# Patient Record
Sex: FEMALE | Race: WHITE | NOT HISPANIC OR LATINO | Employment: UNEMPLOYED | URBAN - METROPOLITAN AREA
[De-identification: names, ages, dates, MRNs, and addresses within clinical notes are randomized per-mention and may not be internally consistent; named-entity substitution may affect disease eponyms.]

---

## 2017-04-12 ENCOUNTER — TELEPHONE (OUTPATIENT)
Dept: OBSTETRICS AND GYNECOLOGY | Facility: CLINIC | Age: 32
End: 2017-04-12

## 2017-04-12 NOTE — TELEPHONE ENCOUNTER
----- Message from Breonna Crews sent at 4/12/2017 12:31 PM CDT -----  Contact: Patient  X _1st Request  _  2nd Request  _  3rd Request    Who:KERRY HYLTON [[id number]]    Why:Patient called to schedule her initial ob appointment LMP 01/05/2017 Patient states she medicaid is in the process of being reactivated and I advised her that her deposit will be between 250..00    What Number to Call Back:[phone]    When to Expect a call back: (Before the end of the day)   -- if call after 3:00 call back will be tomorrow.

## 2017-05-08 ENCOUNTER — HOSPITAL ENCOUNTER (EMERGENCY)
Facility: OTHER | Age: 32
Discharge: HOME | End: 2017-05-08
Attending: EMERGENCY MEDICINE
Payer: COMMERCIAL

## 2017-05-08 VITALS
OXYGEN SATURATION: 99 % | HEART RATE: 63 BPM | SYSTOLIC BLOOD PRESSURE: 108 MMHG | RESPIRATION RATE: 14 BRPM | WEIGHT: 180 LBS | DIASTOLIC BLOOD PRESSURE: 56 MMHG | TEMPERATURE: 99 F | BODY MASS INDEX: 25.77 KG/M2 | HEIGHT: 70 IN

## 2017-05-08 DIAGNOSIS — O26.899 PREGNANCY WITH FLANK PAIN, ANTEPARTUM: Primary | ICD-10-CM

## 2017-05-08 DIAGNOSIS — O26.899 ABDOMINAL PAIN IN PREGNANCY, ANTEPARTUM: ICD-10-CM

## 2017-05-08 DIAGNOSIS — R10.9 ABDOMINAL PAIN IN PREGNANCY, ANTEPARTUM: ICD-10-CM

## 2017-05-08 DIAGNOSIS — R10.9 PREGNANCY WITH FLANK PAIN, ANTEPARTUM: Primary | ICD-10-CM

## 2017-05-08 LAB
B-HCG UR QL: POSITIVE
BILIRUB UR QL STRIP: NEGATIVE
CLARITY UR: CLEAR
COLOR UR: YELLOW
CTP QC/QA: YES
GLUCOSE UR QL STRIP: NEGATIVE
HGB UR QL STRIP: NEGATIVE
KETONES UR QL STRIP: NEGATIVE
LEUKOCYTE ESTERASE UR QL STRIP: NEGATIVE
NITRITE UR QL STRIP: NEGATIVE
PH UR STRIP: 8 [PH] (ref 5–8)
PROT UR QL STRIP: NEGATIVE
SP GR UR STRIP: 1.01 (ref 1–1.03)
URN SPEC COLLECT METH UR: NORMAL
UROBILINOGEN UR STRIP-ACNC: NEGATIVE EU/DL

## 2017-05-08 PROCEDURE — 99283 EMERGENCY DEPT VISIT LOW MDM: CPT | Mod: 25

## 2017-05-08 PROCEDURE — 81003 URINALYSIS AUTO W/O SCOPE: CPT

## 2017-05-08 PROCEDURE — 25000003 PHARM REV CODE 250: Performed by: PHYSICIAN ASSISTANT

## 2017-05-08 PROCEDURE — 87086 URINE CULTURE/COLONY COUNT: CPT

## 2017-05-08 PROCEDURE — 81025 URINE PREGNANCY TEST: CPT | Performed by: PHYSICIAN ASSISTANT

## 2017-05-08 RX ORDER — ACETAMINOPHEN 500 MG
500 TABLET ORAL EVERY 6 HOURS PRN
Qty: 20 TABLET | Refills: 0 | Status: ON HOLD | OUTPATIENT
Start: 2017-05-08 | End: 2017-10-13 | Stop reason: HOSPADM

## 2017-05-08 RX ORDER — ACETAMINOPHEN 500 MG
1000 TABLET ORAL
Status: COMPLETED | OUTPATIENT
Start: 2017-05-08 | End: 2017-05-08

## 2017-05-08 RX ADMIN — ACETAMINOPHEN 1000 MG: 500 TABLET ORAL at 02:05

## 2017-05-08 NOTE — ED TRIAGE NOTES
Patient had a positive pregnancy test around 3 weeks ago and patients last menstrual cycle was in January.  Patient has not had an OB visit yet.  Patient c/o pain to right flank and to RLQ of abdomen for several days.  Patient denies dysuria, odor or discoloration to urine.

## 2017-05-08 NOTE — ED PROVIDER NOTES
Encounter Date: 2017       History     Chief Complaint   Patient presents with    Flank Pain     LMP 1/10/17. ,  burning w/ urination x 1 week ago that has resolved. also c/o RLQ pain     Review of patient's allergies indicates:   Allergen Reactions    Ceclor [cefaclor] Rash    Sulfa (sulfonamide antibiotics) Rash     HPI Comments: Patient is a 31 y.o. Female,  (twin gestation), at approximately 16w6d ega, presenting to the emergency department with complaints of right-sided flank pain.  The patient reports her symptoms have been persistent for the past 3 days.  She states the pain is constant, and rates it at a 6/10.  She denies recent injury or trauma.  She denies numbness, tingling, weakness of her upper or lower extremities bilaterally.  She reports that approximately one week ago she felt as though she had a urinary tract infection, and home treated with cranberry juice and water.  She states she has not yet obtained prenatal care.  She admits that she had an appointment with an OB/GYN scheduled for early this morning, but states that she missed it.  She denies taking any other medication for her symptoms thus far.  She denies persistent dysuria, hematuria.  She does report some associated right lower quadrant abdominal pain that she states occurs intermittently.  She denies fever or chills. She denies vaginal discharge or vaginal bleeding.    The history is provided by the patient.     Past Medical History:   Diagnosis Date    Eczema      Past Surgical History:   Procedure Laterality Date    APPENDECTOMY      DILATION AND CURETTAGE OF UTERUS       Family History   Problem Relation Age of Onset    Family history unknown: Yes     Social History   Substance Use Topics    Smoking status: Never Smoker    Smokeless tobacco: Never Used    Alcohol use No     Review of Systems   Constitutional: Negative for activity change, appetite change, chills, fatigue and fever.   HENT: Negative for  congestion, rhinorrhea and sore throat.    Eyes: Negative for photophobia and visual disturbance.   Respiratory: Negative for cough, shortness of breath and wheezing.    Cardiovascular: Negative for chest pain.   Gastrointestinal: Positive for abdominal pain. Negative for diarrhea, nausea and vomiting.   Genitourinary: Positive for flank pain. Negative for dysuria, hematuria and urgency.   Musculoskeletal: Negative for back pain, myalgias and neck pain.   Skin: Negative for color change and wound.   Neurological: Negative for weakness and headaches.   Psychiatric/Behavioral: Negative for agitation and confusion.       Physical Exam   Initial Vitals   BP Pulse Resp Temp SpO2   05/08/17 1319 05/08/17 1319 05/08/17 1319 05/08/17 1319 05/08/17 1319   127/67 86 16 98 °F (36.7 °C) 98 %     Physical Exam    Nursing note and vitals reviewed.  Constitutional: Vital signs are normal. She appears well-developed and well-nourished. She is not diaphoretic. She is cooperative.  Non-toxic appearance. She does not have a sickly appearance. She does not appear ill. No distress.   Well appearing,  female accompanied by male in the emergency department.  She speaking clear and full sentences, moving all extremities.  She is in no acute distress.   HENT:   Head: Normocephalic and atraumatic.   Right Ear: External ear normal.   Left Ear: External ear normal.   Nose: Nose normal.   Mouth/Throat: Oropharynx is clear and moist.   Eyes: Conjunctivae and EOM are normal.   Neck: Normal range of motion. Neck supple.   Cardiovascular: Normal rate, regular rhythm and normal heart sounds.   Pulmonary/Chest: Breath sounds normal. No respiratory distress. She has no wheezes.   Abdominal: Soft. Bowel sounds are normal. She exhibits no distension. There is tenderness. There is no rebound, no guarding and no CVA tenderness.   Minimal tenderness to palpation of the right lower quadrant with no rebound, guarding, mass.  Gravid uterus with the  fundus below the umbilicus.   Musculoskeletal: Normal range of motion.   Neurological: She is alert and oriented to person, place, and time. GCS eye subscore is 4. GCS verbal subscore is 5. GCS motor subscore is 6.   Skin: Skin is warm.   Psychiatric: She has a normal mood and affect. Her behavior is normal. Judgment and thought content normal.         ED Course   Procedures  Labs Reviewed   POCT URINE PREGNANCY - Abnormal; Notable for the following:        Result Value    POC Preg Test, Ur Positive (*)     All other components within normal limits   CULTURE, URINE   CULTURE, URINE   URINALYSIS             Medical Decision Making:   History:   Old Medical Records: I decided to obtain old medical records.  Old Records Summarized: other records.       <> Summary of Records: Reviewed medical record in epic   Clinical Tests:   Lab Tests: Ordered and Reviewed  The following lab test(s) were unremarkable: UPT and Urinalysis  Other:   I have discussed this case with another health care provider.       <> Summary of the Discussion: Dr. Hsu  This note was created using Dragon Medical Dictation. There may be typographical errors secondary to dictation.     Urgent evaluation of a 31 y.o. female,  at estimated 16w6d ega, presenting to the emergency department complaining of right flank pain, and abdominal pain. Patient is afebrile, nontoxic appearing and hemodynamically stable. Physical exam reveals regular rate and rhythm, lungs are clear to auscultation bilaterally.  No CVA tenderness palpation.  Mild tenderness to palpation the right lower quadrant.  We'll plan to obtain UPT, UA, fetal heart tones and reassess.  Fetal heart tones are between 150-160 bpm. UA shows no evidence of acute urinary tract infection. Will plan to discuss the case with OBGYN for recommendations as the patient has not yet been able to establish prenatal care.   2:42 PM discussed the case with OBGYN.  Agree with treatment plan thus far.  No  further recommendations.  I did explain and stressed the patient the importance of obtaining prenatal care and close OB/GYN follow-up.  I explained that she is due for an anatomy scan.  The patient stated that she was going to walk up to the OB/GYN clinic this afternoon and try to reschedule her appointment.  Patient reports some relief her symptoms after receiving Tylenol.  Urine culture is sent.  Patient is stable for discharge home. The patient was instructed to follow up with a primary care provider in 2 days or to return to the emergency department for worsening symptoms. The treatment plan was discussed with the patient who demonstrated understanding and comfort with plan. The patient's history, physical exam, and plan of care was discussed with and agreed upon with my supervising physician.     Past Medical History:   Diagnosis Date    Eczema                      ED Course     Clinical Impression:     1. Pregnancy with flank pain, antepartum    2. Abdominal pain in pregnancy, antepartum       Disposition:   Disposition: Discharged  Condition: Stable       Vicky Frorester PA-C  05/08/17 [id number]

## 2017-05-08 NOTE — PROVIDER PROGRESS NOTES - EMERGENCY DEPT.
Encounter Date: 5/8/2017    ED Physician Progress Notes        Physician Note:   I evaluated the patient in triage and performed medical screening exam. Patient stable at this time and awaiting bed. Chief complaint and vital signs reviewed.

## 2017-05-08 NOTE — ED AVS SNAPSHOT
[facility]  [address] [address]               Rosario Barrera   2017  1:19 PM   ED    Description:  Female : 1985   Department:  [facility]           Your Care was Coordinated By:     Provider Role From To    Nikolas Hsu II, MD Attending Provider 17 1404 --    Vicky Forrester PA-C Physician Assistant 17 1319 17 1320    Vicky Forrester PA-C Physician Assistant 17 1404 --      Reason for Visit     Flank Pain           Diagnoses this Visit        Comments    Pregnancy with flank pain, antepartum    -  Primary     Abdominal pain in pregnancy, antepartum           ED Disposition     None           To Do List           Follow-up Information     Follow up with Fariba Ash MD In 2 days.    Specialty:  Obstetrics and Gynecology    Contact information:    [address] [facility] [address]  [phone]          Follow up with [facility].    Specialty:  Emergency Medicine    Why:  If symptoms worsen    Contact information:    [address] [id number] [phone]       These Medications        Disp Refills Start End    acetaminophen (TYLENOL) 500 MG tablet 20 tablet 0 2017     Take 1 tablet (500 mg total) by mouth every 6 (six) hours as needed. - Oral      [facility]     [facility] Nurse Care Line - 24/7 Assistance  Unless otherwise directed by your provider, please contact [facility], our nurse care line that is available for 24/7 assistance.     Registered nurses in the [facility] provide: appointment scheduling, clinical advisement, health education, and other advisory services.  Call: [phone] (toll free)               Medications           Message regarding Medications     Verify the changes and/or additions to your medication regime listed below are the same as discussed with your clinician today.   "If any of these changes or additions are incorrect, please notify your healthcare provider.        START taking these NEW medications        Refills    acetaminophen (TYLENOL) 500 MG tablet 0    Sig: Take 1 tablet (500 mg total) by mouth every 6 (six) hours as needed.    Class: Print    Route: Oral      These medications were administered today        Dose Freq    acetaminophen tablet 1,000 mg 1,000 mg ED 1 Time    Sig: Take 2 tablets (1,000 mg total) by mouth ED 1 Time.    Class: Normal    Route: Oral      STOP taking these medications     SPRING-C DHA 35-1-200 mg Cap Take 1 capsule by mouth once daily.    oxycodone-acetaminophen (PERCOCET) 5-325 mg per tablet Take 1 tablet by mouth every 4 (four) hours as needed.    naproxen (NAPROSYN) 500 MG tablet Take 1 tablet (500 mg total) by mouth every 8 (eight) hours as needed (cramping).    iron polysaccharides (NIFEREX) 150 mg iron Cap Take 1 capsule (150 mg total) by mouth 2 (two) times daily.    buprenorphine HCl 8 mg Subl Place 10 mg under the tongue.            Verify that the below list of medications is an accurate representation of the medications you are currently taking.  If none reported, the list may be blank. If incorrect, please contact your healthcare provider. Carry this list with you in case of emergency.           Current Medications     acetaminophen (TYLENOL) 500 MG tablet Take 1 tablet (500 mg total) by mouth every 6 (six) hours as needed.           Clinical Reference Information           Your Vitals Were     BP Pulse Temp Resp Height Weight    127/67 86 98 °F (36.7 °C) (Oral) 16 5' 10" (1.778 m) 81.6 kg (180 lb)    SpO2 BMI             98% 25.83 kg/m2         Allergies as of 5/8/2017        Reactions    Ceclor [Cefaclor] Rash    Sulfa (Sulfonamide Antibiotics) Rash      Immunizations Administered on Date of Encounter - 5/8/2017     None      ED Micro, Lab, POCT     Start Ordered       Status Ordering Provider    05/08/17 1432 05/08/17 1431  Urine " culture  Add-on      Completed     05/08/17 1321 05/08/17 1320  Urinalysis  STAT      Final result     05/08/17 1320 05/08/17 1320  POCT urine pregnancy  Once      Final result     05/08/17 1320 05/08/17 1320  Urine culture  Once      In process       ED Imaging Orders     None      Discharge References/Attachments     FLANK PAIN, UNCERTAIN CAUSE (ENGLISH)    ABDOMINAL PAIN, EARLY PREGNANCY (ENGLISH)      [facility] Sign-Up     Activating your [facility] account is as easy as 1-2-3!     1) Visit [email or web address], select Sign Up Now, enter this activation code and your date of birth, then select Next.  [email or web address]  Expires: 6/22/2017  2:55 PM      2) Create a username and password to use when you visit [facility] in the future and select a security question in case you lose your password and select Next.    3) Enter your e-mail address and click Sign Up!    Additional Information  If you have questions, please e-mail [email or web address] or call [phone] to talk to our [facility] staff. Remember, [facility] is NOT to be used for urgent needs. For medical emergencies, dial 911.          [facility] complies with applicable Federal civil rights laws and does not discriminate on the basis of race, color, national origin, age, disability, or sex.        Language Assistance Services     ATTENTION: Language assistance services are available, free of charge. Please call [phone].      ATENCIÓN: Si habla español, tiene a davis disposición servicios gratuitos de asistencia lingüística. Llame al [phone].     CHÚ Ý: N?u b?n nói Ti?ng Vi?t, có các d?ch v? h? tr? ngôn ng? mi?n phí dành cho b?n. G?i s? [phone].

## 2017-05-10 LAB
BACTERIA UR CULT: NORMAL
BACTERIA UR CULT: NORMAL

## 2017-05-16 ENCOUNTER — INITIAL PRENATAL (OUTPATIENT)
Dept: OBSTETRICS AND GYNECOLOGY | Facility: CLINIC | Age: 32
End: 2017-05-16
Payer: MEDICAID

## 2017-05-16 VITALS — BODY MASS INDEX: 27.2 KG/M2 | WEIGHT: 189.63 LBS | SYSTOLIC BLOOD PRESSURE: 110 MMHG | DIASTOLIC BLOOD PRESSURE: 80 MMHG

## 2017-05-16 DIAGNOSIS — Z87.59 HISTORY OF TWIN PREGNANCY IN PRIOR PREGNANCY: ICD-10-CM

## 2017-05-16 DIAGNOSIS — Z87.59 HISTORY OF GESTATIONAL HYPERTENSION: ICD-10-CM

## 2017-05-16 DIAGNOSIS — O09.892 SHORT INTERVAL BETWEEN PREGNANCIES AFFECTING PREGNANCY IN SECOND TRIMESTER, ANTEPARTUM: ICD-10-CM

## 2017-05-16 DIAGNOSIS — F11.10 HEROIN ABUSE: ICD-10-CM

## 2017-05-16 DIAGNOSIS — Z32.01 POSITIVE URINE PREGNANCY TEST: Primary | ICD-10-CM

## 2017-05-16 LAB
C TRACH DNA SPEC QL NAA+PROBE: NOT DETECTED
N GONORRHOEA DNA SPEC QL NAA+PROBE: NOT DETECTED

## 2017-05-16 PROCEDURE — 99213 OFFICE O/P EST LOW 20 MIN: CPT | Mod: PBBFAC | Performed by: OBSTETRICS & GYNECOLOGY

## 2017-05-16 PROCEDURE — 99214 OFFICE O/P EST MOD 30 MIN: CPT | Mod: S$PBB,TH,, | Performed by: OBSTETRICS & GYNECOLOGY

## 2017-05-16 PROCEDURE — 87086 URINE CULTURE/COLONY COUNT: CPT

## 2017-05-16 PROCEDURE — 87591 N.GONORRHOEAE DNA AMP PROB: CPT

## 2017-05-16 PROCEDURE — 99999 PR PBB SHADOW E&M-EST. PATIENT-LVL III: CPT | Mod: PBBFAC,,, | Performed by: OBSTETRICS & GYNECOLOGY

## 2017-05-16 RX ORDER — BUPRENORPHINE HYDROCHLORIDE 8 MG/1
TABLET SUBLINGUAL
Refills: 0 | COMMUNITY
Start: 2017-05-12 | End: 2017-09-26

## 2017-05-16 NOTE — PROGRESS NOTES
Good fetal movement.  No contractions, no vaginal bleeding, and no loss of fluid.    Significant history:   Recent Di-Di- twin IUP  History of CS x 1  H/o GHTN  Current Subutex for heroin addiction  Plan:   Prenatal labs and hepatitis panel; MFM ultrasound and consult ordered  Quad desired-- will wait until dates confirmed  Counseled to avoid cat litter, not garden without gloves, avoid raw meat, heat up deli meat, to eat large fish like tuna no more than once a week, and to avoid soft unpasteurized cheeses.  I recommend a PNV daily.  She should avoid ibuprofen.  Last pap 2015 in [place], normal per patient

## 2017-05-16 NOTE — MR AVS SNAPSHOT
[facility] [address]  [address]  [address]  [facility] [address]  Phone: [phone]  Fax: [phone]                  Rosario Barrera   2017 8:45 AM   Initial Prenatal    Description:  Female : 1985   Provider:  Fariba Ash MD   Department:  [facility] [address]           Reason for Visit     new pregnancy                To Do List           Goals (5 Years of Data)     None      [facility]     [facility] On [facility] Nurse Care Line -  Assistance  Unless otherwise directed by your provider, please contact [facility], our nurse care line that is available for  assistance.     Registered nurses in the [facility] Center provide: appointment scheduling, clinical advisement, health education, and other advisory services.  Call: [phone] (toll free)               Medications           Message regarding Medications     Verify the changes and/or additions to your medication regime listed below are the same as discussed with your clinician today.  If any of these changes or additions are incorrect, please notify your healthcare provider.             Verify that the below list of medications is an accurate representation of the medications you are currently taking.  If none reported, the list may be blank. If incorrect, please contact your healthcare provider. Carry this list with you in case of emergency.           Current Medications     acetaminophen (TYLENOL) 500 MG tablet Take 1 tablet (500 mg total) by mouth every 6 (six) hours as needed.    buprenorphine HCl (SUBUTEX) 8 mg Subl            Clinical Reference Information           Prenatal Vitals     Enc. Date GA Prenatal Vitals Prenatal Pulse Pain Level Urine Albumin/Glucose Edema Presentation Dilation/Effacement/Station    17  110/80 / 86 kg (189 lb 9.5 oz)   3           TW kg (0 lb)   Pregravid weight: 86 kg (189 lb 9.5 oz)       Your Vitals Were     BP Weight BMI          110/80  86 kg (189 lb 9.5 oz) 27.2 kg/m2        Allergies as of 5/16/2017     Ceclor [Cefaclor]    Sulfa (Sulfonamide Antibiotics)      Immunizations Administered on Date of Encounter - 5/16/2017     None      [facility] Sign-Up     Activating your [facility] account is as easy as 1-2-3!     1) Visit [email or web address], select Sign Up Now, enter this activation code and your date of birth, then select Next.  [email or web address]  Expires: 6/22/2017  2:55 PM      2) Create a username and password to use when you visit [facility] in the future and select a security question in case you lose your password and select Next.    3) Enter your e-mail address and click Sign Up!    Additional Information  If you have questions, please e-mail [email or web address] or call [phone] to talk to our [facility] staff. Remember, [facility] is NOT to be used for urgent needs. For medical emergencies, dial 911.         Language Assistance Services     ATTENTION: Language assistance services are available, free of charge. Please call [phone].      ATENCIÓN: Si habla español, tiene a davis disposición servicios gratuitos de asistencia lingüística. Llame al [phone].     CHÚ Ý: N?u b?n nói Ti?ng Vi?t, có các d?ch v? h? tr? ngôn ng? mi?n phí dành cho b?n. G?i s? [phone].         [facility] [address] complies with applicable Federal civil rights laws and does not discriminate on the basis of race, color, national origin, age, disability, or sex.

## 2017-05-17 LAB
BACTERIA UR CULT: NORMAL
BACTERIA UR CULT: NORMAL

## 2017-06-09 ENCOUNTER — OFFICE VISIT (OUTPATIENT)
Dept: MATERNAL FETAL MEDICINE | Facility: CLINIC | Age: 32
End: 2017-06-09
Attending: OBSTETRICS & GYNECOLOGY
Payer: MEDICAID

## 2017-06-09 VITALS
DIASTOLIC BLOOD PRESSURE: 88 MMHG | BODY MASS INDEX: 27.43 KG/M2 | WEIGHT: 191.13 LBS | SYSTOLIC BLOOD PRESSURE: 140 MMHG

## 2017-06-09 DIAGNOSIS — Z32.01 POSITIVE URINE PREGNANCY TEST: ICD-10-CM

## 2017-06-09 DIAGNOSIS — Z36.89 ENCOUNTER FOR ULTRASOUND TO CHECK FETAL GROWTH: Primary | ICD-10-CM

## 2017-06-09 PROCEDURE — 99212 OFFICE O/P EST SF 10 MIN: CPT | Mod: PBBFAC,25 | Performed by: OBSTETRICS & GYNECOLOGY

## 2017-06-09 PROCEDURE — 99214 OFFICE O/P EST MOD 30 MIN: CPT | Mod: 25,TH,S$PBB, | Performed by: OBSTETRICS & GYNECOLOGY

## 2017-06-09 PROCEDURE — 99999 PR PBB SHADOW E&M-EST. PATIENT-LVL II: CPT | Mod: PBBFAC,,, | Performed by: OBSTETRICS & GYNECOLOGY

## 2017-06-09 PROCEDURE — 76811 OB US DETAILED SNGL FETUS: CPT | Mod: 26,S$PBB,, | Performed by: OBSTETRICS & GYNECOLOGY

## 2017-06-09 PROCEDURE — 76811 OB US DETAILED SNGL FETUS: CPT | Mod: PBBFAC | Performed by: OBSTETRICS & GYNECOLOGY

## 2017-06-09 NOTE — PROGRESS NOTES
Chief complaint: Opioid use in pregnancy    Physician requesting consultation: Fariba Ash, *    31 y.o.  at 22w1d    Please review today's ultrasound report for specific details.    Today I discussed with the patient the association of maternal opioid use and  outcomes.  In particular I discussed the risk of acute maternal withdrawal and the increased risk of spontaneous miscarriage.  I also discussed with the patient the association of increased rates of fetal growth restriction and opioid use.  I further reviewed the risk of  addiction and the need for  hospitalization for the management of  abstinence syndrome.  I explained to the patient that is not uncommon for some neonates to stay in the hospital for days to weeks for treatment and that treatment regimens include medications such as methadone, phenobarbital and morphine.    For the remainder of the pregnancy I would recommend the patient continue with opioid replacement at a level sufficient to prevent acute maternal withdrawal.  This can be accomplished by the administration methadone or suboxone by a specialist qualified in the management of opioid addiction were by her primary obstetrician if desired.  I recommend repeat fetal growth assessments every 4-6 weeks starting in the third trimester to assure no evidence of fetal growth restriction.  I would consider induction of labor at 39 weeks if cervix is favorable.  I would be cognizant that during labor fetal nonstress testing may be nonreactive secondary to opioid effects on fetal heart rate variability.  Because of the increased risk of false positive fetal non-stress tests in women who take daily opiods, I would not recommend routine third trimester fetal non-stress tests unless evidence of fetal growth restriction is found.     Results of today's ultrasound discussed with patient.  I spent 30 minutes with patient today over half of which was in  consultation separate of her ultrasound examination.     Referring physician to receive copy of today's consultation via electronic medical record.

## 2017-06-09 NOTE — LETTER
June 9, 2017      Fariba Ash MD  [address] [facility] [address]           [facility]  [address]  [facility] [address]  Phone: [phone]          Patient: Rosario Barrera   MR Number: [id number]   YOB: 1985   Date of Visit: 6/9/2017       Dear Dr. Fariba Ash:    Thank you for referring Rosario Barrera to me for evaluation. Attached you will find relevant portions of my assessment and plan of care.    If you have questions, please do not hesitate to call me. I look forward to following Rosario Barrera along with you.    Sincerely,    Paulino Cohen MD    Enclosure  CC:  No Recipients    If you would like to receive this communication electronically, please contact [email or web address] or [phone] to request more information on [address] Link access.    For providers and/or their staff who would like to refer a patient to [facility], please contact us through our one-stop-shop provider referral line, [facility], at [phone].    If you feel you have received this communication in error or would no longer like to receive these types of communications, please e-mail [email or web address]

## 2017-07-11 ENCOUNTER — OFFICE VISIT (OUTPATIENT)
Dept: MATERNAL FETAL MEDICINE | Facility: CLINIC | Age: 32
End: 2017-07-11
Payer: MEDICAID

## 2017-07-11 DIAGNOSIS — Z36.89 ENCOUNTER FOR ULTRASOUND TO CHECK FETAL GROWTH: ICD-10-CM

## 2017-07-11 PROCEDURE — 76816 OB US FOLLOW-UP PER FETUS: CPT | Mod: PBBFAC | Performed by: OBSTETRICS & GYNECOLOGY

## 2017-07-11 PROCEDURE — 76816 OB US FOLLOW-UP PER FETUS: CPT | Mod: 26,S$PBB,, | Performed by: OBSTETRICS & GYNECOLOGY

## 2017-07-11 PROCEDURE — 99499 UNLISTED E&M SERVICE: CPT | Mod: S$PBB,,, | Performed by: OBSTETRICS & GYNECOLOGY

## 2017-07-11 NOTE — PROGRESS NOTES
Indication  ========    f/u growth/ hx heroine abuse.    History  ======    General History  Other: Heroin abuse  no screenings  Previous Outcomes   3  Para 1  Vinson children born living (T) 2  Vinson children born (T) 2  Abortions (A) 1  Vinson living children (L) 2  Miscarriages 1    Method  ======    Transabdominal ultrasound examination. View: Sufficient.    Pregnancy  =========    Vinson pregnancy. Number of fetuses: 1.    Dating  ======    LMP on: 2017  GA by LMP 26 w + 5 d  GILSON by LMP: 10/12/2017  Ultrasound examination on: 2017  GA by U/S based upon: AC, BPD, Femur, HC  GA by U/S 28 w + 5 d  GILSON by U/S: 2017  Assigned: Dating performed on 2017, based on the LMP  Assigned GA 26 w + 5 d  Assigned GILSON: 10/12/2017    General Evaluation  ==============    Cardiac activity: present.  bpm.  Fetal movements: visualized.  Presentation: breech.  Placenta: anterior.  Umbilical cord: 3 vessel cord.  Amniotic fluid: MVP 6.1 cm.    Fetal Biometry  ============    Fetal Biometry  BPD 71.8 mm 28w 6d Hadlock  OFD 93.1 mm 30w 0d Kal  .3 mm 28w 5d Hadlock  .0 mm 29w 5d Hadlock  Femur 51.4 mm 27w 3d Hadlock  CM 5.5 mm  EFW 1,296 g 75% Jorge Alberto  Calculated by: Hadlock (BPD-HC-AC-FL)  EFW (lb) 2 lb  EFW (oz) 14 oz  Cephalic index 0.77  HC / AC 1.03  FL / BPD 0.72  FL / AC 0.20  MVP 6.1 cm   bpm  Head / Face / Neck   7.6 mm    Fetal Anatomy  ============    Cranium: normal  Lateral ventricles: normal  Cavum septi pellucidi: normal  Vermis: normal  Neck: normal  Lips: normal  Profile: normal  Nose: normal  4-chamber view: normal  Stomach: normal  Kidneys: normal  Bladder: normal  Sacral spine: normal  Rt hand: open  Lt hand: open  Wants to know gender: no  Other: A full anatomic survey has been previously performed.    Impression  =========    Fetal size is AGA with the EFW at the 75th percentile.  Normal repeat limited fetal anatomic survey. AFV is  normal.    Recommendation  ==============    We recommend repeat evaluation for fetal growth assessment in 6 weeks.

## 2017-07-13 ENCOUNTER — TELEPHONE (OUTPATIENT)
Dept: OBSTETRICS AND GYNECOLOGY | Facility: CLINIC | Age: 32
End: 2017-07-13

## 2017-07-13 NOTE — TELEPHONE ENCOUNTER
----- Message from Fariba Ash MD sent at 7/12/2017  9:08 AM CDT -----  Patient has no-showed two appointments.  Please call and see if we can get her scheduled.      Thanks!

## 2017-08-16 ENCOUNTER — TELEPHONE (OUTPATIENT)
Dept: OBSTETRICS AND GYNECOLOGY | Facility: CLINIC | Age: 32
End: 2017-08-16

## 2017-08-16 NOTE — TELEPHONE ENCOUNTER
----- Message from Nena Lewis sent at 8/16/2017 12:37 PM CDT -----  Contact: pt  X  1st Request  _  2nd Request  _  3rd Request    Who:KERRY HYLTON [[id number]]    Why: Patient states she is returning a call     What Number to Call Back: [phone]    When to Expect a call back: (Before the end of the day)   -- if call after 3:00 call back will be tomorrow.

## 2017-08-16 NOTE — TELEPHONE ENCOUNTER
----- Message from Gary Rodríguez sent at 8/16/2017 10:47 AM CDT -----  Please call ob pt no details [phone]

## 2017-08-16 NOTE — TELEPHONE ENCOUNTER
Called pt and she said that she needs a letter from us stating that we know that she takes subutex. I informed pt that she needs to come into our office first. We have not seen pt since her first ob appt. Pt canceled and no showed all other appts. Pt verbalized understanding. Made appt with Nilsa tomorrow.

## 2017-08-18 ENCOUNTER — TELEPHONE (OUTPATIENT)
Dept: OBSTETRICS AND GYNECOLOGY | Facility: CLINIC | Age: 32
End: 2017-08-18

## 2017-08-24 ENCOUNTER — TELEPHONE (OUTPATIENT)
Dept: OBSTETRICS AND GYNECOLOGY | Facility: CLINIC | Age: 32
End: 2017-08-24

## 2017-08-24 NOTE — TELEPHONE ENCOUNTER
----- Message from Guanako Nance RN sent at 8/21/2017  3:16 PM CDT -----  Thank you!  ----- Message -----  From: Bonnie Naik LPN  Sent: 8/21/2017   2:27 PM  To: Guanako Nance RN    Ok. I'm leaving in 5 minutes but I'll call her tomorrow.    ----- Message -----  From: Guanako Nance RN  Sent: 8/21/2017   1:24 PM  To: John RAIN III Staff, Saqib CARMICHAEL Staff     Hi ladies,    Can one of you help to get this patient scheduled at [place], please see Dr. Ash's message.  ----- Message -----  From: Fariba Ash MD  Sent: 8/21/2017  12:20 PM  To: Guanako Nance RN    Please get her scheduled with the [facility] as I have seen her one time and she has no showed >3 visits.    Thanks!    ----- Message -----  From: Guanako Nance RN  Sent: 8/21/2017  11:48 AM  To: Fariba Ash MD    Hi Dr. Ash    Pt is scheduled for f/u MFM scan tomorrow at 120p and she has not seen you since May for prenatal visit. If she shows up, do you want to see her? She will be 32w5d tomorrow    guanako

## 2017-08-31 ENCOUNTER — TELEPHONE (OUTPATIENT)
Dept: OBSTETRICS AND GYNECOLOGY | Facility: CLINIC | Age: 32
End: 2017-08-31

## 2017-08-31 NOTE — TELEPHONE ENCOUNTER
----- Message from Gary Rodríguez sent at 8/31/2017  2:37 PM CDT -----  PLEASE CALL PT NO DETAILS [phone]

## 2017-09-12 DIAGNOSIS — O99.320 PREGNANCY COMPLICATED BY SUBUTEX MAINTENANCE, ANTEPARTUM: Primary | ICD-10-CM

## 2017-09-12 DIAGNOSIS — F11.20 PREGNANCY COMPLICATED BY SUBUTEX MAINTENANCE, ANTEPARTUM: Primary | ICD-10-CM

## 2017-09-14 ENCOUNTER — ROUTINE PRENATAL (OUTPATIENT)
Dept: OBSTETRICS AND GYNECOLOGY | Facility: CLINIC | Age: 32
End: 2017-09-14
Payer: MEDICAID

## 2017-09-14 ENCOUNTER — LAB VISIT (OUTPATIENT)
Dept: LAB | Facility: OTHER | Age: 32
End: 2017-09-14
Attending: OBSTETRICS & GYNECOLOGY
Payer: MEDICAID

## 2017-09-14 ENCOUNTER — TELEPHONE (OUTPATIENT)
Dept: OBSTETRICS AND GYNECOLOGY | Facility: CLINIC | Age: 32
End: 2017-09-14

## 2017-09-14 ENCOUNTER — PATIENT MESSAGE (OUTPATIENT)
Dept: OBSTETRICS AND GYNECOLOGY | Facility: CLINIC | Age: 32
End: 2017-09-14

## 2017-09-14 VITALS
DIASTOLIC BLOOD PRESSURE: 80 MMHG | SYSTOLIC BLOOD PRESSURE: 130 MMHG | BODY MASS INDEX: 30.97 KG/M2 | WEIGHT: 215.81 LBS

## 2017-09-14 DIAGNOSIS — O09.33 LIMITED PRENATAL CARE IN THIRD TRIMESTER: ICD-10-CM

## 2017-09-14 DIAGNOSIS — D50.8 ANEMIA, DUE TO INADEQUATE IRON INTAKE: Primary | ICD-10-CM

## 2017-09-14 DIAGNOSIS — F11.10 HEROIN ABUSE: ICD-10-CM

## 2017-09-14 DIAGNOSIS — O09.892 SHORT INTERVAL BETWEEN PREGNANCIES AFFECTING PREGNANCY IN SECOND TRIMESTER, ANTEPARTUM: ICD-10-CM

## 2017-09-14 DIAGNOSIS — Z32.01 POSITIVE URINE PREGNANCY TEST: ICD-10-CM

## 2017-09-14 DIAGNOSIS — F19.10 DRUG ABUSE: Primary | ICD-10-CM

## 2017-09-14 LAB
ABO + RH BLD: NORMAL
BASOPHILS # BLD AUTO: 0.02 K/UL
BASOPHILS NFR BLD: 0.3 %
BLD GP AB SCN CELLS X3 SERPL QL: NORMAL
DIFFERENTIAL METHOD: ABNORMAL
EOSINOPHIL # BLD AUTO: 0.2 K/UL
EOSINOPHIL NFR BLD: 3.4 %
ERYTHROCYTE [DISTWIDTH] IN BLOOD BY AUTOMATED COUNT: 15 %
ESTIMATED AVG GLUCOSE: 103 MG/DL
HBA1C MFR BLD HPLC: 5.2 %
HCT VFR BLD AUTO: 28.7 %
HGB BLD-MCNC: 8.9 G/DL
LYMPHOCYTES # BLD AUTO: 1.8 K/UL
LYMPHOCYTES NFR BLD: 28 %
MCH RBC QN AUTO: 23.4 PG
MCHC RBC AUTO-ENTMCNC: 31 G/DL
MCV RBC AUTO: 76 FL
MONOCYTES # BLD AUTO: 0.5 K/UL
MONOCYTES NFR BLD: 8.1 %
NEUTROPHILS # BLD AUTO: 3.9 K/UL
NEUTROPHILS NFR BLD: 59.9 %
PLATELET # BLD AUTO: 279 K/UL
PMV BLD AUTO: 8.8 FL
RBC # BLD AUTO: 3.8 M/UL
WBC # BLD AUTO: 6.44 K/UL

## 2017-09-14 PROCEDURE — 86850 RBC ANTIBODY SCREEN: CPT

## 2017-09-14 PROCEDURE — 80074 ACUTE HEPATITIS PANEL: CPT

## 2017-09-14 PROCEDURE — 83036 HEMOGLOBIN GLYCOSYLATED A1C: CPT

## 2017-09-14 PROCEDURE — 99212 OFFICE O/P EST SF 10 MIN: CPT | Mod: PBBFAC,25 | Performed by: OBSTETRICS & GYNECOLOGY

## 2017-09-14 PROCEDURE — 86592 SYPHILIS TEST NON-TREP QUAL: CPT

## 2017-09-14 PROCEDURE — 86703 HIV-1/HIV-2 1 RESULT ANTBDY: CPT

## 2017-09-14 PROCEDURE — 3008F BODY MASS INDEX DOCD: CPT | Mod: ,,, | Performed by: OBSTETRICS & GYNECOLOGY

## 2017-09-14 PROCEDURE — 99999 PR PBB SHADOW E&M-EST. PATIENT-LVL II: CPT | Mod: PBBFAC,,, | Performed by: OBSTETRICS & GYNECOLOGY

## 2017-09-14 PROCEDURE — 99214 OFFICE O/P EST MOD 30 MIN: CPT | Mod: TH,S$PBB,, | Performed by: OBSTETRICS & GYNECOLOGY

## 2017-09-14 PROCEDURE — 80307 DRUG TEST PRSMV CHEM ANLYZR: CPT

## 2017-09-14 PROCEDURE — 36415 COLL VENOUS BLD VENIPUNCTURE: CPT

## 2017-09-14 PROCEDURE — 85025 COMPLETE CBC W/AUTO DIFF WBC: CPT

## 2017-09-14 PROCEDURE — 86900 BLOOD TYPING SEROLOGIC ABO: CPT

## 2017-09-14 PROCEDURE — 86762 RUBELLA ANTIBODY: CPT

## 2017-09-14 RX ORDER — FERROUS SULFATE 325(65) MG
325 TABLET ORAL DAILY
Qty: 30 TABLET | Refills: 3 | Status: SHIPPED | OUTPATIENT
Start: 2017-09-14 | End: 2018-09-14

## 2017-09-14 NOTE — PROGRESS NOTES
Patient presents today for second prenatal visit.  Good fetal movement.  No contractions, no vaginal bleeding, and no loss of fluid.    Limited prenatal care-  - Patient to go to lab today for all prenatal labs and A1c, hepatitis panel, drug screen  - A1c rather than glucose test- patient cannot stay for an hour today.    - will schedule CS for 40 weeks given that first ultrasound was at 23w5d  History of CS-   - op note reviewed and safe for ; discussed risks of  vs CS.  Patient desires repeat CS.    - Concerns for pain relief- will refer to anesthesia.     Heroin abuse-   - patient is still taking suboxone.  Patient is working with drugs of abuse doctor and should be getting subutex; next appointment with them in 1.5 weeks.  Will have anesthesia referral to discuss pain control.  High risk pregnancy:   - follow up with MFM scheduled for next week   - after this, will schedule twice weekly prenatal testing until delivery

## 2017-09-15 LAB
AMPHET+METHAMPHET UR QL: NEGATIVE
BARBITURATES UR QL SCN>200 NG/ML: NEGATIVE
BENZODIAZ UR QL SCN>200 NG/ML: NEGATIVE
BZE UR QL SCN: NEGATIVE
CANNABINOIDS UR QL SCN: NEGATIVE
CREAT UR-MCNC: 53 MG/DL
ETHANOL UR-MCNC: <10 MG/DL
HAV IGM SERPL QL IA: NEGATIVE
HBV CORE IGM SERPL QL IA: NEGATIVE
HBV SURFACE AG SERPL QL IA: NEGATIVE
HBV SURFACE AG SERPL QL IA: NEGATIVE
HCV AB SERPL QL IA: POSITIVE
HIV 1+2 AB+HIV1 P24 AG SERPL QL IA: NEGATIVE
METHADONE UR QL SCN>300 NG/ML: NEGATIVE
OPIATES UR QL SCN: NORMAL
PCP UR QL SCN>25 NG/ML: NEGATIVE
RPR SER QL: NORMAL
RUBV IGG SER-ACNC: 21.5 IU/ML
RUBV IGG SER-IMP: REACTIVE
TOXICOLOGY INFORMATION: NORMAL

## 2017-09-18 PROBLEM — B19.20 HEPATITIS C: Status: ACTIVE | Noted: 2017-09-18

## 2017-09-21 ENCOUNTER — LAB VISIT (OUTPATIENT)
Dept: LAB | Facility: OTHER | Age: 32
End: 2017-09-21
Attending: OBSTETRICS & GYNECOLOGY
Payer: MEDICAID

## 2017-09-21 ENCOUNTER — ROUTINE PRENATAL (OUTPATIENT)
Dept: OBSTETRICS AND GYNECOLOGY | Facility: CLINIC | Age: 32
End: 2017-09-21
Payer: MEDICAID

## 2017-09-21 VITALS
SYSTOLIC BLOOD PRESSURE: 110 MMHG | BODY MASS INDEX: 29.45 KG/M2 | DIASTOLIC BLOOD PRESSURE: 70 MMHG | WEIGHT: 205.25 LBS

## 2017-09-21 DIAGNOSIS — B19.20 HEPATITIS C VIRUS INFECTION WITHOUT HEPATIC COMA, UNSPECIFIED CHRONICITY: ICD-10-CM

## 2017-09-21 DIAGNOSIS — O09.33 LIMITED PRENATAL CARE IN THIRD TRIMESTER: ICD-10-CM

## 2017-09-21 DIAGNOSIS — F19.10 DRUG ABUSE: ICD-10-CM

## 2017-09-21 DIAGNOSIS — B19.20 HEPATITIS C VIRUS INFECTION WITHOUT HEPATIC COMA, UNSPECIFIED CHRONICITY: Primary | ICD-10-CM

## 2017-09-21 LAB
ALBUMIN SERPL BCP-MCNC: 2.8 G/DL
ALP SERPL-CCNC: 144 U/L
ALT SERPL W/O P-5'-P-CCNC: 29 U/L
ANION GAP SERPL CALC-SCNC: 10 MMOL/L
AST SERPL-CCNC: 27 U/L
BILIRUB SERPL-MCNC: 0.5 MG/DL
BUN SERPL-MCNC: 7 MG/DL
CALCIUM SERPL-MCNC: 9.3 MG/DL
CHLORIDE SERPL-SCNC: 105 MMOL/L
CO2 SERPL-SCNC: 21 MMOL/L
CREAT SERPL-MCNC: 0.7 MG/DL
EST. GFR  (AFRICAN AMERICAN): >60 ML/MIN/1.73 M^2
EST. GFR  (NON AFRICAN AMERICAN): >60 ML/MIN/1.73 M^2
GLUCOSE SERPL-MCNC: 93 MG/DL
POTASSIUM SERPL-SCNC: 3.9 MMOL/L
PROT SERPL-MCNC: 7.4 G/DL
SODIUM SERPL-SCNC: 136 MMOL/L

## 2017-09-21 PROCEDURE — 99999 PR PBB SHADOW E&M-EST. PATIENT-LVL II: CPT | Mod: PBBFAC,,, | Performed by: OBSTETRICS & GYNECOLOGY

## 2017-09-21 PROCEDURE — 99213 OFFICE O/P EST LOW 20 MIN: CPT | Mod: TH,S$PBB,, | Performed by: OBSTETRICS & GYNECOLOGY

## 2017-09-21 PROCEDURE — 87522 HEPATITIS C REVRS TRNSCRPJ: CPT

## 2017-09-21 PROCEDURE — 99212 OFFICE O/P EST SF 10 MIN: CPT | Mod: PBBFAC | Performed by: OBSTETRICS & GYNECOLOGY

## 2017-09-21 PROCEDURE — 3008F BODY MASS INDEX DOCD: CPT | Mod: ,,, | Performed by: OBSTETRICS & GYNECOLOGY

## 2017-09-21 PROCEDURE — 80053 COMPREHEN METABOLIC PANEL: CPT

## 2017-09-21 PROCEDURE — 87081 CULTURE SCREEN ONLY: CPT

## 2017-09-21 PROCEDURE — 36415 COLL VENOUS BLD VENIPUNCTURE: CPT

## 2017-09-21 NOTE — PROGRESS NOTES
Good fetal movement.  No contractions, no vaginal bleeding, and no loss of fluid.    Hepatitis C- I counseled the patient that the presence ofHCV antibody alone does not necessarily result in the vertical transmission of HCV. Only those who have evidence ofHCV viral RNA are at risk for vertical transmission. The rate of transmission is 5% when the patient is HIV negative and 23 % when the patient is HIV positive. The patient understands that there are no means to prevent the vertical transmission of HCV. It is thought that the risk of transmission is related to the level of viremia at birth. Although not positively proven it is thought that prolonged rupture of the membranes may increase the risk of vertical transmission. For this reason it is recommended that the second stage of labor be short. It does not appear that  delivery reduces the risk of vertical transmission in these patients. However, if the patient has HCV RNA the pediatrician should be notified at delivery so that the infant can be tested later. Generally testing occurs at 6- 12 months of age.   There is no safe antiviral treatment for the pregnant woman with hepatitis C. Both ribavirin in interferon have been associated with teratogenesis in animals and are contraindicated in pregnancy.  - HCV RNA PCR test ordered today  - LFTs today  - ultrasound and consultation with JASWANT this Tuesday

## 2017-09-22 ENCOUNTER — HOSPITAL ENCOUNTER (OUTPATIENT)
Dept: PERINATAL CARE | Facility: OTHER | Age: 32
Discharge: HOME | End: 2017-09-22
Attending: OBSTETRICS & GYNECOLOGY
Payer: MEDICAID

## 2017-09-22 DIAGNOSIS — B19.20 HEPATITIS C VIRUS INFECTION WITHOUT HEPATIC COMA, UNSPECIFIED CHRONICITY: ICD-10-CM

## 2017-09-22 DIAGNOSIS — F19.10 DRUG ABUSE: ICD-10-CM

## 2017-09-22 DIAGNOSIS — O09.33 LIMITED PRENATAL CARE IN THIRD TRIMESTER: ICD-10-CM

## 2017-09-22 PROCEDURE — 76815 OB US LIMITED FETUS(S): CPT | Mod: 26,,, | Performed by: OBSTETRICS & GYNECOLOGY

## 2017-09-22 PROCEDURE — 59025 FETAL NON-STRESS TEST: CPT | Mod: 26,,, | Performed by: OBSTETRICS & GYNECOLOGY

## 2017-09-22 PROCEDURE — 76815 OB US LIMITED FETUS(S): CPT

## 2017-09-22 PROCEDURE — 59025 FETAL NON-STRESS TEST: CPT

## 2017-09-25 DIAGNOSIS — B17.10 ACUTE HEPATITIS C VIRUS INFECTION WITHOUT HEPATIC COMA: Primary | ICD-10-CM

## 2017-09-25 LAB
BACTERIA SPEC AEROBE CULT: NORMAL
HCV LOG: 5.11 LOG (10) IU/ML
HCV RNA QUANT PCR: ABNORMAL IU/ML
HCV, QUALITATIVE: DETECTED IU/ML

## 2017-09-26 ENCOUNTER — ROUTINE PRENATAL (OUTPATIENT)
Dept: OBSTETRICS AND GYNECOLOGY | Facility: CLINIC | Age: 32
End: 2017-09-26
Payer: MEDICAID

## 2017-09-26 ENCOUNTER — OFFICE VISIT (OUTPATIENT)
Dept: MATERNAL FETAL MEDICINE | Facility: CLINIC | Age: 32
End: 2017-09-26
Payer: MEDICAID

## 2017-09-26 ENCOUNTER — APPOINTMENT (OUTPATIENT)
Dept: ANESTHESIOLOGY | Facility: OTHER | Age: 32
End: 2017-09-26
Payer: MEDICAID

## 2017-09-26 VITALS
SYSTOLIC BLOOD PRESSURE: 120 MMHG | BODY MASS INDEX: 31.54 KG/M2 | DIASTOLIC BLOOD PRESSURE: 70 MMHG | WEIGHT: 219.81 LBS

## 2017-09-26 VITALS
BODY MASS INDEX: 31.44 KG/M2 | WEIGHT: 219.13 LBS | DIASTOLIC BLOOD PRESSURE: 76 MMHG | SYSTOLIC BLOOD PRESSURE: 124 MMHG

## 2017-09-26 DIAGNOSIS — F19.10 DRUG ABUSE: Primary | ICD-10-CM

## 2017-09-26 DIAGNOSIS — O98.419 ACUTE HEPATITIS C COMPLICATING PREGNANCY, ANTEPARTUM: Primary | ICD-10-CM

## 2017-09-26 DIAGNOSIS — B17.10 ACUTE HEPATITIS C COMPLICATING PREGNANCY, ANTEPARTUM: Primary | ICD-10-CM

## 2017-09-26 DIAGNOSIS — O09.33 LIMITED PRENATAL CARE IN THIRD TRIMESTER: ICD-10-CM

## 2017-09-26 DIAGNOSIS — B19.20 HEPATITIS C VIRUS INFECTION WITHOUT HEPATIC COMA, UNSPECIFIED CHRONICITY: ICD-10-CM

## 2017-09-26 PROCEDURE — 99213 OFFICE O/P EST LOW 20 MIN: CPT | Mod: TH,S$PBB,, | Performed by: OBSTETRICS & GYNECOLOGY

## 2017-09-26 PROCEDURE — 99999 PR PBB SHADOW E&M-EST. PATIENT-LVL II: CPT | Mod: PBBFAC,,, | Performed by: OBSTETRICS & GYNECOLOGY

## 2017-09-26 PROCEDURE — 99213 OFFICE O/P EST LOW 20 MIN: CPT | Mod: S$PBB,25,TH, | Performed by: OBSTETRICS & GYNECOLOGY

## 2017-09-26 PROCEDURE — 99212 OFFICE O/P EST SF 10 MIN: CPT | Mod: PBBFAC,27,25 | Performed by: OBSTETRICS & GYNECOLOGY

## 2017-09-26 PROCEDURE — 99212 OFFICE O/P EST SF 10 MIN: CPT | Mod: PBBFAC | Performed by: OBSTETRICS & GYNECOLOGY

## 2017-09-26 PROCEDURE — 76816 OB US FOLLOW-UP PER FETUS: CPT | Mod: 26,S$PBB,, | Performed by: OBSTETRICS & GYNECOLOGY

## 2017-09-26 PROCEDURE — 3008F BODY MASS INDEX DOCD: CPT | Mod: ,,, | Performed by: OBSTETRICS & GYNECOLOGY

## 2017-09-26 PROCEDURE — 76816 OB US FOLLOW-UP PER FETUS: CPT | Mod: PBBFAC | Performed by: OBSTETRICS & GYNECOLOGY

## 2017-09-26 PROCEDURE — 76819 FETAL BIOPHYS PROFIL W/O NST: CPT | Mod: 26,S$PBB,, | Performed by: OBSTETRICS & GYNECOLOGY

## 2017-09-26 PROCEDURE — 76819 FETAL BIOPHYS PROFIL W/O NST: CPT | Mod: PBBFAC | Performed by: OBSTETRICS & GYNECOLOGY

## 2017-09-26 RX ORDER — BUPRENORPHINE HYDROCHLORIDE, NALOXONE HYDROCHLORIDE 8; 2 MG/1; MG/1
FILM, SOLUBLE BUCCAL; SUBLINGUAL
Refills: 0 | Status: ON HOLD | COMMUNITY
Start: 2017-07-26 | End: 2017-10-13 | Stop reason: HOSPADM

## 2017-09-26 NOTE — PROGRESS NOTES
Indication  ========    Evaluation of fetal growth and BPP: history of drug abuse.    History  ======    General History  Other: Heroin abuse  no screenings  Previous Outcomes   3  Para 1  Vinson children born living (T) 2  Vinson children born (T) 2  Abortions (A) 1  Vinson living children (L) 2  Miscarriages 1    Method  ======    Transabdominal ultrasound examination.    Pregnancy  =========    Vinson pregnancy. Number of fetuses: 1.    Dating  ======    LMP on: 2017  Cycle: regular cycle  GA by LMP 37 w + 5 d  GILSON by LMP: 10/12/2017  Ultrasound examination on: 2017  GA by U/S based upon: AC, BPD, Femur, HC  GA by U/S 38 w + 4 d  GILSON by U/S: 10/6/2017  Assigned: Dating performed on 2017, based on the LMP  Assigned GA 37 w + 5 d  Assigned GILSON: 10/12/2017    General Evaluation  ==============    Cardiac activity: present.  bpm.  Fetal movements: visualized.  Presentation: cephalic.  Placenta: anterior.  Umbilical cord: normal, 3 vessel cord.  Amniotic fluid: Amount of AF: normal amount. MVP 5.2 cm.    Fetal Biometry  ============    Fetal Biometry  BPD 93.3 mm 38w 0d Hadlock  .0 mm  .1 mm 40w 5d Hadlock  .6 mm 38w 3d Hadlock  Femur 72.1 mm 36w 6d Hadlock  EFW 3,455 g 69% Jorge Alberto  Calculated by: Hadlock (BPD-HC-AC-FL)  EFW (lb) 7 lb  EFW (oz) 10 oz  Cephalic index 0.74  HC / AC 1.01  FL / BPD 0.77  FL / AC 0.21  MVP 5.2 cm   bpm    Fetal Anatomy  ============    Cranium: normal  4-chamber view: normal  Stomach: normal  Kidneys: normal  Bladder: normal  Wants to know gender: no  Other: A full anatomic survey has been previously performed.    Maternal Structures  ===============    Uterus / Cervix  Uterus: Normal  Ovaries / Tubes / Adnexa  Rt ovary: Visualized  Lt ovary: Visualized    Consultation  ==========    Chart and prior notes reviewed. Previously seen in consultation by Dr. Cohen for history of heroin abuse and suboxone use. Reconsulted  by  Dr. Ash due to new diagnosis of Hepatitis C.    Patient with intermittent prenatal care over the last several months and represented recently. Labs checked and found to be positive for  Hepatitis C virus. Viral count 128K, HCV Log 5.11. LFTs wnl. HIV negative. She has not had GDM screening, but her HgbA1c is 5.2%.  Discussed likely mode of transmission was related to history of IV heroin use--reports last use > 6 months ago.    Most women with hepatitis C do well during pregnancy. One study reported an increased risk of infants with low birthweight, small for  gestational age, need for assisted ventilation, or requirement  intensive care. HCV-positive mothers may be at risk for an increased risk  of gestational diabetes.    Improvement in serum aminotransferase concentrations occurs during pregnancy, therefore serial LFTs are not indicated in otherwise stable  patients with Hepatitis C. Although antiviral treatment has been shown effective in eradicating HCV and reducing viral replication, treatment is  contraindicated during pregnancy. Since she has never seen a hepatologist, it would be prudent to try to arrange for her to see a hepatologist  during or shortly after pregnancy so that post-mike treatment can be considered. The patient was counseled that the risk of vertical  transmission of the virus may occur, but appears to be much less efficient than for hepatitis B, occurring in about 5 to 10 % of infants born to  women with Hepatitis C. The highest risk seems to be in those with active viral replication and therefore her risk is likley in the 10-15% range,  but there is no role for serial Hepatitis C viral quantification. The risk of infection is approximately threefold higher in infants born to women  co-infected with HCV and HIV. There is no role for elective  in reducing the risk of vertical transmission. I recommend that the patient  undergo a vaginal delivery and would plan to  avoid FSE monitoring during labor unless absolutely necessary.    At this point, with normal fetal growth and fluid, I would not recommend any other alteration from usual obstetric care.      I overall spent approximately 15 minutes in face to face time with the patient and her family, greater than 50% of which was in counseling and  care coordination.    Impression  =========    Fetal size is AGA with the EFW at the 69th percentile.  Normal repeat limited fetal anatomic survey. AFV is normal.  BPP reassuring--  Hepatitis C with active viral replication but normal LFTs, negative HIV.    Recommendation  ==============    See above.  Repeat ultrasound study as clinically indicated  Routine obstetric care at this point; no indication for routine  testing given normal growth and fluid.

## 2017-09-26 NOTE — PROGRESS NOTES
Patient presents today after appointmenst with anesthesia and MFM.  Has appointment with Dr. Rahman on Friday at [facility] in [place] (prescribes suboxone).  Will hopefully switch to subutex at that time.   Occasional cramping. Good fetal movement.  No contractions, no vaginal bleeding, and no loss of fluid.  Again discussed .  Patient will plan for this if she comes in in labor, otherwise will keep CS appointment.

## 2017-09-26 NOTE — LETTER
September 26, 2017      Fariba Ash MD  [address] [facility] [address]           [facility]  [address]  [facility] [address]  Phone: [phone]          Patient: Rosario Barrera   MR Number: [id number]   YOB: 1985   Date of Visit: 9/26/2017       Dear Dr. Fariba Ash:    Thank you for referring Rosario Barrera to me for evaluation. Attached you will find relevant portions of my assessment and plan of care.    If you have questions, please do not hesitate to call me. I look forward to following Rosario Barrera along with you.    Sincerely,    David Raymundo MD    Enclosure  CC:  No Recipients    If you would like to receive this communication electronically, please contact [email or web address] or [phone] to request more information on [facility] access.    For providers and/or their staff who would like to refer a patient to [facility], please contact us through our one-stop-shop provider referral line, [facility], at [phone].    If you feel you have received this communication in error or would no longer like to receive these types of communications, please e-mail [email or web address]

## 2017-10-05 ENCOUNTER — ROUTINE PRENATAL (OUTPATIENT)
Dept: OBSTETRICS AND GYNECOLOGY | Facility: CLINIC | Age: 32
End: 2017-10-05
Payer: MEDICAID

## 2017-10-05 VITALS
SYSTOLIC BLOOD PRESSURE: 120 MMHG | WEIGHT: 218.94 LBS | BODY MASS INDEX: 31.41 KG/M2 | DIASTOLIC BLOOD PRESSURE: 76 MMHG

## 2017-10-05 DIAGNOSIS — F19.10 DRUG ABUSE: Primary | ICD-10-CM

## 2017-10-05 DIAGNOSIS — O09.892 SHORT INTERVAL BETWEEN PREGNANCIES AFFECTING PREGNANCY IN SECOND TRIMESTER, ANTEPARTUM: ICD-10-CM

## 2017-10-05 DIAGNOSIS — O09.33 LIMITED PRENATAL CARE IN THIRD TRIMESTER: ICD-10-CM

## 2017-10-05 PROCEDURE — 99213 OFFICE O/P EST LOW 20 MIN: CPT | Mod: TH,S$PBB,, | Performed by: OBSTETRICS & GYNECOLOGY

## 2017-10-05 PROCEDURE — 99212 OFFICE O/P EST SF 10 MIN: CPT | Mod: PBBFAC | Performed by: OBSTETRICS & GYNECOLOGY

## 2017-10-05 PROCEDURE — 99999 PR PBB SHADOW E&M-EST. PATIENT-LVL II: CPT | Mod: PBBFAC,,, | Performed by: OBSTETRICS & GYNECOLOGY

## 2017-10-05 NOTE — PROGRESS NOTES
Good fetal movement.  No contractions, no vaginal bleeding, and no loss of fluid.  CS next week.  Discussed arrival, NPO after midnight. Patient going to [facility] today to see Dr. Rahman to switch to Subutex prior to delivery.  Pt met with anesthesia already.

## 2017-10-10 ENCOUNTER — HOSPITAL ENCOUNTER (INPATIENT)
Facility: OTHER | Age: 32
LOS: 4 days | Discharge: HOME | End: 2017-10-14
Attending: OBSTETRICS & GYNECOLOGY | Admitting: OBSTETRICS & GYNECOLOGY
Payer: MEDICAID

## 2017-10-10 ENCOUNTER — ANESTHESIA (OUTPATIENT)
Dept: OBSTETRICS AND GYNECOLOGY | Facility: OTHER | Age: 32
End: 2017-10-10
Payer: MEDICAID

## 2017-10-10 ENCOUNTER — ANESTHESIA EVENT (OUTPATIENT)
Dept: OBSTETRICS AND GYNECOLOGY | Facility: OTHER | Age: 32
End: 2017-10-10
Payer: MEDICAID

## 2017-10-10 DIAGNOSIS — Z98.891 S/P REPEAT LOW TRANSVERSE C-SECTION: Primary | ICD-10-CM

## 2017-10-10 PROBLEM — Z98.51 S/P TUBAL LIGATION: Status: ACTIVE | Noted: 2017-10-10

## 2017-10-10 LAB
ABO + RH BLD: NORMAL
ALLENS TEST: ABNORMAL
ANISOCYTOSIS BLD QL SMEAR: SLIGHT
BASOPHILS # BLD AUTO: 0 K/UL
BASOPHILS # BLD AUTO: 0.01 K/UL
BASOPHILS # BLD AUTO: 0.01 K/UL
BASOPHILS NFR BLD: 0 %
BASOPHILS NFR BLD: 0.1 %
BASOPHILS NFR BLD: 0.1 %
BLD GP AB SCN CELLS X3 SERPL QL: NORMAL
DIFFERENTIAL METHOD: ABNORMAL
EOSINOPHIL # BLD AUTO: 0 K/UL
EOSINOPHIL # BLD AUTO: 0 K/UL
EOSINOPHIL # BLD AUTO: 0.1 K/UL
EOSINOPHIL NFR BLD: 0 %
EOSINOPHIL NFR BLD: 0.3 %
EOSINOPHIL NFR BLD: 0.5 %
ERYTHROCYTE [DISTWIDTH] IN BLOOD BY AUTOMATED COUNT: 15.6 %
ERYTHROCYTE [DISTWIDTH] IN BLOOD BY AUTOMATED COUNT: 15.6 %
ERYTHROCYTE [DISTWIDTH] IN BLOOD BY AUTOMATED COUNT: 15.9 %
HCO3 UR-SCNC: 20 MMOL/L (ref 24–28)
HCT VFR BLD AUTO: 25.3 %
HCT VFR BLD AUTO: 28.2 %
HCT VFR BLD AUTO: 32.4 %
HGB BLD-MCNC: 10 G/DL
HGB BLD-MCNC: 7.8 G/DL
HGB BLD-MCNC: 8.8 G/DL
HYPOCHROMIA BLD QL SMEAR: ABNORMAL
LYMPHOCYTES # BLD AUTO: 1.1 K/UL
LYMPHOCYTES # BLD AUTO: 1.3 K/UL
LYMPHOCYTES # BLD AUTO: 1.6 K/UL
LYMPHOCYTES NFR BLD: 17.2 %
LYMPHOCYTES NFR BLD: 8.6 %
LYMPHOCYTES NFR BLD: 9.7 %
MCH RBC QN AUTO: 22.5 PG
MCH RBC QN AUTO: 22.6 PG
MCH RBC QN AUTO: 22.8 PG
MCHC RBC AUTO-ENTMCNC: 30.8 G/DL
MCHC RBC AUTO-ENTMCNC: 30.9 G/DL
MCHC RBC AUTO-ENTMCNC: 31.2 G/DL
MCV RBC AUTO: 73 FL
MONOCYTES # BLD AUTO: 0.8 K/UL
MONOCYTES NFR BLD: 5.7 %
MONOCYTES NFR BLD: 6.2 %
MONOCYTES NFR BLD: 8.3 %
NEUTROPHILS # BLD AUTO: 10.6 K/UL
NEUTROPHILS # BLD AUTO: 11.6 K/UL
NEUTROPHILS # BLD AUTO: 7.1 K/UL
NEUTROPHILS NFR BLD: 73.9 %
NEUTROPHILS NFR BLD: 84 %
NEUTROPHILS NFR BLD: 84.8 %
OVALOCYTES BLD QL SMEAR: ABNORMAL
PCO2 BLDA: 61 MMHG (ref 35–45)
PH SMN: 7.12 [PH] (ref 7.35–7.45)
PLATELET # BLD AUTO: 194 K/UL
PLATELET # BLD AUTO: 204 K/UL
PLATELET # BLD AUTO: 251 K/UL
PLATELET BLD QL SMEAR: ABNORMAL
PMV BLD AUTO: 9.3 FL
PMV BLD AUTO: 9.5 FL
PMV BLD AUTO: 9.7 FL
PO2 BLDA: 17 MMHG (ref 80–100)
POC BE: -9 MMOL/L
POC SATURATED O2: 14 % (ref 95–100)
POIKILOCYTOSIS BLD QL SMEAR: SLIGHT
POLYCHROMASIA BLD QL SMEAR: ABNORMAL
RBC # BLD AUTO: 3.47 M/UL
RBC # BLD AUTO: 3.86 M/UL
RBC # BLD AUTO: 4.42 M/UL
SAMPLE: ABNORMAL
SCHISTOCYTES BLD QL SMEAR: PRESENT
SITE: ABNORMAL
SPHEROCYTES BLD QL SMEAR: ABNORMAL
WBC # BLD AUTO: 12.5 K/UL
WBC # BLD AUTO: 13.81 K/UL
WBC # BLD AUTO: 9.54 K/UL

## 2017-10-10 PROCEDURE — 72100002 HC LABOR CARE, 1ST 8 HOURS

## 2017-10-10 PROCEDURE — 25000003 PHARM REV CODE 250: Performed by: STUDENT IN AN ORGANIZED HEALTH CARE EDUCATION/TRAINING PROGRAM

## 2017-10-10 PROCEDURE — S0028 INJECTION, FAMOTIDINE, 20 MG: HCPCS | Performed by: PAIN MEDICINE

## 2017-10-10 PROCEDURE — 25000003 PHARM REV CODE 250: Performed by: PAIN MEDICINE

## 2017-10-10 PROCEDURE — 58600 DIVISION OF FALLOPIAN TUBE: CPT

## 2017-10-10 PROCEDURE — 11000001 HC ACUTE MED/SURG PRIVATE ROOM

## 2017-10-10 PROCEDURE — 88302 TISSUE EXAM BY PATHOLOGIST: CPT | Mod: 26,,, | Performed by: PATHOLOGY

## 2017-10-10 PROCEDURE — 27800517 HC TRAY,EPIDURAL-CONTINUOUS: Performed by: PAIN MEDICINE

## 2017-10-10 PROCEDURE — 37000008 HC ANESTHESIA 1ST 15 MINUTES: Performed by: OBSTETRICS & GYNECOLOGY

## 2017-10-10 PROCEDURE — 62326 NJX INTERLAMINAR LMBR/SAC: CPT | Performed by: ANESTHESIOLOGY

## 2017-10-10 PROCEDURE — 63600175 PHARM REV CODE 636 W HCPCS: Performed by: STUDENT IN AN ORGANIZED HEALTH CARE EDUCATION/TRAINING PROGRAM

## 2017-10-10 PROCEDURE — 25000003 PHARM REV CODE 250: Performed by: ANESTHESIOLOGY

## 2017-10-10 PROCEDURE — 51702 INSERT TEMP BLADDER CATH: CPT

## 2017-10-10 PROCEDURE — 0UB70ZZ EXCISION OF BILATERAL FALLOPIAN TUBES, OPEN APPROACH: ICD-10-PCS | Performed by: OBSTETRICS & GYNECOLOGY

## 2017-10-10 PROCEDURE — 59514 CESAREAN DELIVERY ONLY: CPT | Mod: ,,, | Performed by: ANESTHESIOLOGY

## 2017-10-10 PROCEDURE — 27200710 HC EPIDURAL INFUSION PUMP SET: Performed by: PAIN MEDICINE

## 2017-10-10 PROCEDURE — 85025 COMPLETE CBC W/AUTO DIFF WBC: CPT

## 2017-10-10 PROCEDURE — 96374 THER/PROPH/DIAG INJ IV PUSH: CPT

## 2017-10-10 PROCEDURE — 88302 TISSUE EXAM BY PATHOLOGIST: CPT | Performed by: PATHOLOGY

## 2017-10-10 PROCEDURE — 37000009 HC ANESTHESIA EA ADD 15 MINS: Performed by: OBSTETRICS & GYNECOLOGY

## 2017-10-10 PROCEDURE — 59514 CESAREAN DELIVERY ONLY: CPT | Mod: GB,,, | Performed by: OBSTETRICS & GYNECOLOGY

## 2017-10-10 PROCEDURE — 63600175 PHARM REV CODE 636 W HCPCS: Performed by: ANESTHESIOLOGY

## 2017-10-10 PROCEDURE — 63600175 PHARM REV CODE 636 W HCPCS: Performed by: OBSTETRICS & GYNECOLOGY

## 2017-10-10 PROCEDURE — 59025 FETAL NON-STRESS TEST: CPT | Mod: 26,,, | Performed by: OBSTETRICS & GYNECOLOGY

## 2017-10-10 PROCEDURE — 99285 EMERGENCY DEPT VISIT HI MDM: CPT | Mod: 25

## 2017-10-10 PROCEDURE — 99900035 HC TECH TIME PER 15 MIN (STAT)

## 2017-10-10 PROCEDURE — 63600175 PHARM REV CODE 636 W HCPCS

## 2017-10-10 PROCEDURE — 59025 FETAL NON-STRESS TEST: CPT

## 2017-10-10 PROCEDURE — 63600175 PHARM REV CODE 636 W HCPCS: Performed by: PAIN MEDICINE

## 2017-10-10 PROCEDURE — 86900 BLOOD TYPING SEROLOGIC ABO: CPT

## 2017-10-10 PROCEDURE — 36415 COLL VENOUS BLD VENIPUNCTURE: CPT

## 2017-10-10 PROCEDURE — 36000684 HC CESAREAN SECTION, UNSCHEDULED

## 2017-10-10 PROCEDURE — 82803 BLOOD GASES ANY COMBINATION: CPT

## 2017-10-10 PROCEDURE — 86901 BLOOD TYPING SEROLOGIC RH(D): CPT

## 2017-10-10 PROCEDURE — 99283 EMERGENCY DEPT VISIT LOW MDM: CPT | Mod: 25,,, | Performed by: OBSTETRICS & GYNECOLOGY

## 2017-10-10 RX ORDER — KETOROLAC TROMETHAMINE 30 MG/ML
30 INJECTION, SOLUTION INTRAMUSCULAR; INTRAVENOUS ONCE
Status: COMPLETED | OUTPATIENT
Start: 2017-10-11 | End: 2017-10-11

## 2017-10-10 RX ORDER — BUPIVACAINE HYDROCHLORIDE 2.5 MG/ML
INJECTION, SOLUTION EPIDURAL; INFILTRATION; INTRACAUDAL
Status: DISPENSED
Start: 2017-10-10 | End: 2017-10-10

## 2017-10-10 RX ORDER — ACETAMINOPHEN 10 MG/ML
INJECTION, SOLUTION INTRAVENOUS
Status: DISCONTINUED | OUTPATIENT
Start: 2017-10-10 | End: 2017-10-10

## 2017-10-10 RX ORDER — PHENYLEPHRINE HYDROCHLORIDE 10 MG/ML
INJECTION INTRAVENOUS
Status: DISCONTINUED | OUTPATIENT
Start: 2017-10-10 | End: 2017-10-10

## 2017-10-10 RX ORDER — MIDAZOLAM HYDROCHLORIDE 1 MG/ML
INJECTION INTRAMUSCULAR; INTRAVENOUS
Status: DISCONTINUED | OUTPATIENT
Start: 2017-10-10 | End: 2017-10-10

## 2017-10-10 RX ORDER — BUTORPHANOL TARTRATE 1 MG/ML
2 INJECTION INTRAMUSCULAR; INTRAVENOUS ONCE
Status: COMPLETED | OUTPATIENT
Start: 2017-10-10 | End: 2017-10-10

## 2017-10-10 RX ORDER — METHYLERGONOVINE MALEATE 0.2 MG/ML
INJECTION INTRAVENOUS
Status: DISPENSED
Start: 2017-10-10 | End: 2017-10-10

## 2017-10-10 RX ORDER — SODIUM CHLORIDE, SODIUM LACTATE, POTASSIUM CHLORIDE, CALCIUM CHLORIDE 600; 310; 30; 20 MG/100ML; MG/100ML; MG/100ML; MG/100ML
INJECTION, SOLUTION INTRAVENOUS CONTINUOUS
Status: DISCONTINUED | OUTPATIENT
Start: 2017-10-10 | End: 2017-10-10

## 2017-10-10 RX ORDER — BUPRENORPHINE HYDROCHLORIDE AND NALOXONE HYDROCHLORIDE DIHYDRATE 8; 2 MG/1; MG/1
2 TABLET SUBLINGUAL EVERY 6 HOURS
Status: DISCONTINUED | OUTPATIENT
Start: 2017-10-10 | End: 2017-10-10

## 2017-10-10 RX ORDER — FAMOTIDINE 10 MG/ML
20 INJECTION INTRAVENOUS ONCE
Status: COMPLETED | OUTPATIENT
Start: 2017-10-10 | End: 2017-10-10

## 2017-10-10 RX ORDER — PROMETHAZINE HYDROCHLORIDE 25 MG/ML
INJECTION, SOLUTION INTRAMUSCULAR; INTRAVENOUS
Status: DISCONTINUED | OUTPATIENT
Start: 2017-10-10 | End: 2017-10-10

## 2017-10-10 RX ORDER — OXYCODONE HYDROCHLORIDE 5 MG/1
10 TABLET ORAL EVERY 4 HOURS PRN
Status: DISPENSED | OUTPATIENT
Start: 2017-10-10 | End: 2017-10-11

## 2017-10-10 RX ORDER — FENTANYL CITRATE 50 UG/ML
INJECTION, SOLUTION INTRAMUSCULAR; INTRAVENOUS
Status: DISPENSED
Start: 2017-10-10 | End: 2017-10-10

## 2017-10-10 RX ORDER — OXYTOCIN/RINGER'S LACTATE 20/1000 ML
41.65 PLASTIC BAG, INJECTION (ML) INTRAVENOUS CONTINUOUS
Status: ACTIVE | OUTPATIENT
Start: 2017-10-10 | End: 2017-10-10

## 2017-10-10 RX ORDER — SIMETHICONE 80 MG
1 TABLET,CHEWABLE ORAL EVERY 6 HOURS PRN
Status: DISCONTINUED | OUTPATIENT
Start: 2017-10-10 | End: 2017-10-14 | Stop reason: HOSPADM

## 2017-10-10 RX ORDER — IBUPROFEN 600 MG/1
600 TABLET ORAL EVERY 6 HOURS
Status: DISCONTINUED | OUTPATIENT
Start: 2017-10-11 | End: 2017-10-14 | Stop reason: HOSPADM

## 2017-10-10 RX ORDER — HYDROMORPHONE HYDROCHLORIDE 2 MG/ML
INJECTION, SOLUTION INTRAMUSCULAR; INTRAVENOUS; SUBCUTANEOUS
Status: DISCONTINUED | OUTPATIENT
Start: 2017-10-10 | End: 2017-10-10

## 2017-10-10 RX ORDER — CARBOPROST TROMETHAMINE 250 UG/ML
250 INJECTION, SOLUTION INTRAMUSCULAR
Status: DISCONTINUED | OUTPATIENT
Start: 2017-10-10 | End: 2017-10-10

## 2017-10-10 RX ORDER — BUPRENORPHINE 2 MG/1
2 TABLET SUBLINGUAL EVERY 6 HOURS
Status: DISCONTINUED | OUTPATIENT
Start: 2017-10-10 | End: 2017-10-14 | Stop reason: HOSPADM

## 2017-10-10 RX ORDER — METHYLERGONOVINE MALEATE 0.2 MG/ML
INJECTION INTRAVENOUS
Status: COMPLETED
Start: 2017-10-10 | End: 2017-10-10

## 2017-10-10 RX ORDER — ONDANSETRON 2 MG/ML
4 INJECTION INTRAMUSCULAR; INTRAVENOUS EVERY 6 HOURS PRN
Status: CANCELLED | OUTPATIENT
Start: 2017-10-10

## 2017-10-10 RX ORDER — OXYCODONE HYDROCHLORIDE 5 MG/1
10 TABLET ORAL EVERY 4 HOURS PRN
Status: CANCELLED | OUTPATIENT
Start: 2017-10-10

## 2017-10-10 RX ORDER — OXYTOCIN 10 [USP'U]/ML
INJECTION, SOLUTION INTRAMUSCULAR; INTRAVENOUS
Status: DISCONTINUED | OUTPATIENT
Start: 2017-10-10 | End: 2017-10-10

## 2017-10-10 RX ORDER — ACETAMINOPHEN 325 MG/1
650 TABLET ORAL EVERY 6 HOURS
Status: CANCELLED | OUTPATIENT
Start: 2017-10-10 | End: 2017-10-11

## 2017-10-10 RX ORDER — FENTANYL CITRATE 50 UG/ML
INJECTION, SOLUTION INTRAMUSCULAR; INTRAVENOUS
Status: DISCONTINUED | OUTPATIENT
Start: 2017-10-10 | End: 2017-10-10

## 2017-10-10 RX ORDER — OXYTOCIN/RINGER'S LACTATE 20/1000 ML
41.65 PLASTIC BAG, INJECTION (ML) INTRAVENOUS CONTINUOUS
Status: DISCONTINUED | OUTPATIENT
Start: 2017-10-10 | End: 2017-10-10

## 2017-10-10 RX ORDER — METHYLERGONOVINE MALEATE 0.2 MG/ML
200 INJECTION INTRAVENOUS
Status: DISCONTINUED | OUTPATIENT
Start: 2017-10-10 | End: 2017-10-10

## 2017-10-10 RX ORDER — DIPHENHYDRAMINE HYDROCHLORIDE 50 MG/ML
INJECTION INTRAMUSCULAR; INTRAVENOUS
Status: DISCONTINUED | OUTPATIENT
Start: 2017-10-10 | End: 2017-10-10

## 2017-10-10 RX ORDER — FENTANYL/BUPIVACAINE/NS/PF 2MCG/ML-.1
PLASTIC BAG, INJECTION (ML) INJECTION CONTINUOUS PRN
Status: DISCONTINUED | OUTPATIENT
Start: 2017-10-10 | End: 2017-10-10

## 2017-10-10 RX ORDER — ONDANSETRON 8 MG/1
8 TABLET, ORALLY DISINTEGRATING ORAL EVERY 8 HOURS PRN
Status: DISCONTINUED | OUTPATIENT
Start: 2017-10-10 | End: 2017-10-14 | Stop reason: HOSPADM

## 2017-10-10 RX ORDER — LIDOCAINE HCL/EPINEPHRINE/PF 2%-1:200K
VIAL (ML) INJECTION
Status: DISCONTINUED | OUTPATIENT
Start: 2017-10-10 | End: 2017-10-10

## 2017-10-10 RX ORDER — SODIUM CITRATE AND CITRIC ACID MONOHYDRATE 334; 500 MG/5ML; MG/5ML
30 SOLUTION ORAL ONCE
Status: COMPLETED | OUTPATIENT
Start: 2017-10-10 | End: 2017-10-10

## 2017-10-10 RX ORDER — ONDANSETRON 2 MG/ML
4 INJECTION INTRAMUSCULAR; INTRAVENOUS EVERY 6 HOURS PRN
Status: ACTIVE | OUTPATIENT
Start: 2017-10-10 | End: 2017-10-11

## 2017-10-10 RX ORDER — DOCUSATE SODIUM 100 MG/1
200 CAPSULE, LIQUID FILLED ORAL 2 TIMES DAILY PRN
Status: DISCONTINUED | OUTPATIENT
Start: 2017-10-10 | End: 2017-10-14 | Stop reason: HOSPADM

## 2017-10-10 RX ORDER — FENTANYL/BUPIVACAINE/NS/PF 2MCG/ML-.1
PLASTIC BAG, INJECTION (ML) INJECTION
Status: DISPENSED
Start: 2017-10-10 | End: 2017-10-10

## 2017-10-10 RX ORDER — MISOPROSTOL 200 UG/1
800 TABLET ORAL
Status: DISCONTINUED | OUTPATIENT
Start: 2017-10-10 | End: 2017-10-10

## 2017-10-10 RX ORDER — ACETAMINOPHEN 325 MG/1
650 TABLET ORAL EVERY 6 HOURS
Status: COMPLETED | OUTPATIENT
Start: 2017-10-10 | End: 2017-10-11

## 2017-10-10 RX ORDER — HYDROCODONE BITARTRATE AND ACETAMINOPHEN 5; 325 MG/1; MG/1
1 TABLET ORAL EVERY 4 HOURS PRN
Status: DISCONTINUED | OUTPATIENT
Start: 2017-10-11 | End: 2017-10-14 | Stop reason: HOSPADM

## 2017-10-10 RX ORDER — LIDOCAINE HCL/EPINEPHRINE/PF 2%-1:200K
VIAL (ML) INJECTION
Status: DISPENSED
Start: 2017-10-10 | End: 2017-10-10

## 2017-10-10 RX ORDER — ONDANSETRON 8 MG/1
8 TABLET, ORALLY DISINTEGRATING ORAL EVERY 8 HOURS PRN
Status: DISCONTINUED | OUTPATIENT
Start: 2017-10-10 | End: 2017-10-10

## 2017-10-10 RX ORDER — OXYCODONE HYDROCHLORIDE 5 MG/1
5 TABLET ORAL EVERY 4 HOURS PRN
Status: CANCELLED | OUTPATIENT
Start: 2017-10-10

## 2017-10-10 RX ORDER — HYDROCODONE BITARTRATE AND ACETAMINOPHEN 10; 325 MG/1; MG/1
1 TABLET ORAL EVERY 4 HOURS PRN
Status: DISCONTINUED | OUTPATIENT
Start: 2017-10-11 | End: 2017-10-14 | Stop reason: HOSPADM

## 2017-10-10 RX ORDER — LIDOCAINE HYDROCHLORIDE AND EPINEPHRINE 15; 5 MG/ML; UG/ML
INJECTION, SOLUTION EPIDURAL
Status: DISCONTINUED | OUTPATIENT
Start: 2017-10-10 | End: 2017-10-10

## 2017-10-10 RX ORDER — KETOROLAC TROMETHAMINE 30 MG/ML
30 INJECTION, SOLUTION INTRAMUSCULAR; INTRAVENOUS EVERY 6 HOURS
Status: CANCELLED | OUTPATIENT
Start: 2017-10-10 | End: 2017-10-11

## 2017-10-10 RX ORDER — FENTANYL/BUPIVACAINE/NS/PF 2MCG/ML-.1
PLASTIC BAG, INJECTION (ML) INJECTION CONTINUOUS
Status: DISCONTINUED | OUTPATIENT
Start: 2017-10-10 | End: 2017-10-10

## 2017-10-10 RX ORDER — BUPRENORPHINE HYDROCHLORIDE AND NALOXONE HYDROCHLORIDE DIHYDRATE 8; 2 MG/1; MG/1
8 TABLET SUBLINGUAL DAILY
Status: DISCONTINUED | OUTPATIENT
Start: 2017-10-10 | End: 2017-10-10

## 2017-10-10 RX ORDER — KETOROLAC TROMETHAMINE 30 MG/ML
30 INJECTION, SOLUTION INTRAMUSCULAR; INTRAVENOUS EVERY 6 HOURS
Status: COMPLETED | OUTPATIENT
Start: 2017-10-10 | End: 2017-10-10

## 2017-10-10 RX ORDER — OXYCODONE HYDROCHLORIDE 5 MG/1
5 TABLET ORAL EVERY 4 HOURS PRN
Status: ACTIVE | OUTPATIENT
Start: 2017-10-10 | End: 2017-10-11

## 2017-10-10 RX ORDER — DIPHENHYDRAMINE HCL 25 MG
25 CAPSULE ORAL EVERY 4 HOURS PRN
Status: DISCONTINUED | OUTPATIENT
Start: 2017-10-10 | End: 2017-10-14 | Stop reason: HOSPADM

## 2017-10-10 RX ORDER — ONDANSETRON HYDROCHLORIDE 2 MG/ML
INJECTION, SOLUTION INTRAMUSCULAR; INTRAVENOUS
Status: DISCONTINUED | OUTPATIENT
Start: 2017-10-10 | End: 2017-10-10

## 2017-10-10 RX ADMIN — KETOROLAC TROMETHAMINE 30 MG: 30 INJECTION, SOLUTION INTRAMUSCULAR at 07:10

## 2017-10-10 RX ADMIN — ACETAMINOPHEN 1000 MG: 10 INJECTION, SOLUTION INTRAVENOUS at 06:10

## 2017-10-10 RX ADMIN — Medication 10 ML/HR: at 03:10

## 2017-10-10 RX ADMIN — FENTANYL CITRATE 50 MCG: 50 INJECTION, SOLUTION INTRAMUSCULAR; INTRAVENOUS at 03:10

## 2017-10-10 RX ADMIN — BUTORPHANOL TARTRATE 2 MG: 1 INJECTION, SOLUTION INTRAMUSCULAR; INTRAVENOUS at 02:10

## 2017-10-10 RX ADMIN — PHENYLEPHRINE HYDROCHLORIDE 100 MCG: 10 INJECTION INTRAVENOUS at 06:10

## 2017-10-10 RX ADMIN — OXYCODONE HYDROCHLORIDE 10 MG: 5 TABLET ORAL at 09:10

## 2017-10-10 RX ADMIN — MIDAZOLAM HYDROCHLORIDE 2 MG: 1 INJECTION, SOLUTION INTRAMUSCULAR; INTRAVENOUS at 06:10

## 2017-10-10 RX ADMIN — ONDANSETRON 4 MG: 2 INJECTION, SOLUTION INTRAMUSCULAR; INTRAVENOUS at 06:10

## 2017-10-10 RX ADMIN — PHENYLEPHRINE HYDROCHLORIDE 100 MCG: 10 INJECTION INTRAVENOUS at 07:10

## 2017-10-10 RX ADMIN — PROMETHAZINE HYDROCHLORIDE 12.5 MG: 25 INJECTION INTRAMUSCULAR; INTRAVENOUS at 07:10

## 2017-10-10 RX ADMIN — ACETAMINOPHEN 650 MG: 325 TABLET ORAL at 01:10

## 2017-10-10 RX ADMIN — ACETAMINOPHEN 650 MG: 325 TABLET ORAL at 06:10

## 2017-10-10 RX ADMIN — AZITHROMYCIN MONOHYDRATE 500 MG: 500 INJECTION, POWDER, LYOPHILIZED, FOR SOLUTION INTRAVENOUS at 06:10

## 2017-10-10 RX ADMIN — FAMOTIDINE 20 MG: 10 INJECTION INTRAVENOUS at 05:10

## 2017-10-10 RX ADMIN — DOCUSATE SODIUM 200 MG: 100 CAPSULE, LIQUID FILLED ORAL at 09:10

## 2017-10-10 RX ADMIN — PROMETHAZINE HYDROCHLORIDE 12.5 MG: 25 INJECTION INTRAMUSCULAR; INTRAVENOUS at 06:10

## 2017-10-10 RX ADMIN — DEXTROSE 2 G: 50 INJECTION, SOLUTION INTRAVENOUS at 06:10

## 2017-10-10 RX ADMIN — BUPRENORPHINE HYDROCHLORIDE 2 MG: 2 TABLET SUBLINGUAL at 06:10

## 2017-10-10 RX ADMIN — OXYTOCIN 20 UNITS: 10 INJECTION, SOLUTION INTRAMUSCULAR; INTRAVENOUS at 06:10

## 2017-10-10 RX ADMIN — KETOROLAC TROMETHAMINE 30 MG: 30 INJECTION, SOLUTION INTRAMUSCULAR at 01:10

## 2017-10-10 RX ADMIN — DIPHENHYDRAMINE HYDROCHLORIDE 25 MG: 50 INJECTION, SOLUTION INTRAMUSCULAR; INTRAVENOUS at 07:10

## 2017-10-10 RX ADMIN — FENTANYL CITRATE 100 MCG: 50 INJECTION, SOLUTION INTRAMUSCULAR; INTRAVENOUS at 06:10

## 2017-10-10 RX ADMIN — METHYLERGONOVINE MALEATE 200 MCG: 0.2 INJECTION, SOLUTION INTRAMUSCULAR; INTRAVENOUS at 06:10

## 2017-10-10 RX ADMIN — LIDOCAINE HYDROCHLORIDE,EPINEPHRINE BITARTRATE 5 ML: 20; .005 INJECTION, SOLUTION EPIDURAL; INFILTRATION; INTRACAUDAL; PERINEURAL at 06:10

## 2017-10-10 RX ADMIN — Medication 5 ML: at 03:10

## 2017-10-10 RX ADMIN — SODIUM CITRATE AND CITRIC ACID MONOHYDRATE 30 ML: 500; 334 SOLUTION ORAL at 05:10

## 2017-10-10 RX ADMIN — LIDOCAINE HYDROCHLORIDE,EPINEPHRINE BITARTRATE 3 ML: 15; .005 INJECTION, SOLUTION EPIDURAL; INFILTRATION; INTRACAUDAL; PERINEURAL at 03:10

## 2017-10-10 RX ADMIN — HYDROMORPHONE HYDROCHLORIDE 0.75 MG: 2 INJECTION, SOLUTION INTRAMUSCULAR; INTRAVENOUS; SUBCUTANEOUS at 06:10

## 2017-10-10 RX ADMIN — Medication 8 ML: at 04:10

## 2017-10-10 NOTE — ANESTHESIA PREPROCEDURE EVALUATION
Rosario Barrera is a 32 y.o. female  who presents with contractions. Pt with hx of drug abuse, HCV and gestational HTN. Pt with hx of c/s x 1 2/2 twins. No hx of anesthesia complications.     OB History    Para Term  AB Living   3 1 1 0 1 2   SAB TAB Ectopic Multiple Live Births   1 0 0 1 2      # Outcome Date GA Lbr José/2nd Weight Sex Delivery Anes PTL Lv   3 Current            2A Term 16 37w1d  2.9 kg (6 lb 6.3 oz) M CS-LTranv Spinal N TONNY   2B Term 16 37w1d  2.311 kg (5 lb 1.5 oz) M CS-LTranv Spinal N TONNY   1 SAB  9w0d                 Wt Readings from Last 1 Encounters:   10/10/17 0300 93 kg (205 lb)       BP Readings from Last 3 Encounters:   10/10/17 123/60   10/05/17 120/76   17 120/70       Patient Active Problem List   Diagnosis    Drug abuse (heroin/pills) - on subu 8mg per day- followed by Dr Lacey Douglas in [place]     delivery delivered    Short interval between pregnancies affecting pregnancy in second trimester, antepartum    History of twin pregnancy in prior pregnancy    History of gestational hypertension    Limited prenatal care in third trimester    Hepatitis C    Indication for care in labor or delivery       Past Surgical History:   Procedure Laterality Date    APPENDECTOMY      DILATION AND CURETTAGE OF UTERUS         Social History     Social History    Marital status: Single     Spouse name: N/A    Number of children: N/A    Years of education: N/A     Occupational History    Not on file.     Social History Main Topics    Smoking status: Never Smoker    Smokeless tobacco: Never Used    Alcohol use No    Drug use:      Types: Heroin      Comment: on subutex     Sexual activity: Yes     Partners: Male     Other Topics Concern    Not on file     Social History Narrative    No narrative on file         Chemistry        Component Value Date/Time      09/21/2017 1540    K 3.9 09/21/2017 1540     09/21/2017 1540    CO2 21 (L) 09/21/2017 1540    BUN 7 09/21/2017 1540    CREATININE 0.7 09/21/2017 1540    GLU 93 09/21/2017 1540        Component Value Date/Time    CALCIUM 9.3 09/21/2017 1540    ALKPHOS 144 (H) 09/21/2017 1540    AST 27 09/21/2017 1540    ALT 29 09/21/2017 1540    BILITOT 0.5 09/21/2017 1540    ESTGFRAFRICA >60 09/21/2017 1540    EGFRNONAA >60 09/21/2017 1540            Lab Results   Component Value Date    WBC 9.54 10/10/2017    HGB 10.0 (L) 10/10/2017    HCT 32.4 (L) 10/10/2017    MCV 73 (L) 10/10/2017     10/10/2017       No results for input(s): INR, PROTIME, APTT in the last 72 hours.    Invalid input(s): PT                  Anesthesia Evaluation    I have reviewed the Patient Summary Reports.     I have reviewed the Medications.     Review of Systems  Anesthesia Hx:  No problems with previous Anesthesia Denies Hx of Anesthetic complications  History of prior surgery of interest to airway management or planning:  Denies Personal Hx of Anesthesia complications.   Hematology/Oncology:     Oncology Normal    -- Anemia:   EENT/Dental:EENT/Dental Normal   Cardiovascular:  Cardiovascular Normal     Pulmonary:  Pulmonary Normal    Renal/:  Renal/ Normal     Hepatic/GI:   Liver Disease, Hepatitis    Musculoskeletal:  Musculoskeletal Normal    Neurological:  Neurology Normal    Endocrine:  Endocrine Normal    Dermatological:  Skin Normal    Psych:  Psychiatric Normal           Physical Exam  General:  Well nourished    Airway/Jaw/Neck:  Airway Findings: Mouth Opening: Normal Tongue: Normal  General Airway Assessment: Adult  Mallampati: III  Improves to II with phonation.  TM Distance: Normal, at least 6 cm  Jaw/Neck Findings:  Neck ROM: Normal ROM     Eyes/Ears/Nose:  EYES/EARS/NOSE FINDINGS: Normal   Dental:  Dental Findings: In tact         Mental Status:  Mental Status Findings:  Cooperative, Alert and Oriented         Anesthesia  Plan  Type of Anesthesia, risks & benefits discussed:  Anesthesia Type:  CSE, epidural, general, spinal  Patient's Preference:   Intra-op Monitoring Plan: standard ASA monitors  Intra-op Monitoring Plan Comments:   Post Op Pain Control Plan:   Post Op Pain Control Plan Comments:   Induction:   IV  Beta Blocker:  Patient is not currently on a Beta-Blocker (No further documentation required).       Informed Consent: Patient understands risks and agrees with Anesthesia plan.  Questions answered. Anesthesia consent signed with patient.  ASA Score: 3     Day of Surgery Review of History & Physical:    H&P update referred to the surgeon.         Ready For Surgery From Anesthesia Perspective.

## 2017-10-10 NOTE — HOSPITAL COURSE
10/10/2017 - patient admitted from [facility] with ROM, thick meconium, and contractions in labor. Desired . Fetus was noted to not be tolerating labor with late decelerations so the decision was made to take the patient to the OR for a repeat Cs. RLTCS & BTL performed without complication.  10/11/2017 - POD # 1 s/p RLTCS + BTL - Patient doing well. No complaints.  Starting to meet PP goals.  10/12/2017 - POD#2  s/p RLTCS + BTL - Pt continues to do well. Meeting all postop milestones.  10/13/2017 - POD#3. Tolerating diet, ambulating, voiding, +BM, pain well controlled.   10/14/2017 - POD#4. Pt doing well. Continues to meet all post op milestones and desires discharge home today.

## 2017-10-10 NOTE — L&D DELIVERY NOTE
Section Procedure Note    Procedure:   1. Repeat  Section via Pfannenstiel skin incision  2. Bilateral Tubal Ligation    Indications:   1. failure to progress: arrest of dilation   2. Fetal intolerance to labor    Pre-operative Diagnosis:   1. IUP at 39 week 5 day pregnancy  2. Failure to progress, Arrest of dilation at 5cm  3. Fetal intolerance to labor  4. H/o  section  5. Gestational hypertension  6. Hepatitis C infection  7. H/o drug abuse    Post-operative Diagnosis:   same    Surgeon: Dr. Yasmin Rodriguez    Assistants: Eboni Amaya MD - PGY3    Anesthesia: Spinal anesthesia    Findings:    1. Single viable  MAle infant, with APGARS 8/9, weight 3730g.   2. Normal appearing uterus, ovaries, and fallopian tubes.  3. Normal placenta    Estimated Blood Loss:  980 mL           Total IV Fluids: 1600 mL     UOP: 200 mL    Specimens: none    PreOp CBC:   Lab Results   Component Value Date    WBC 12.50 10/10/2017    HGB 8.8 (L) 10/10/2017    HCT 28.2 (L) 10/10/2017    MCV 73 (L) 10/10/2017     10/10/2017                     Complications:  None; patient tolerated the procedure well.           Disposition: PACU - hemodynamically stable.           Condition: stable    Procedure Details   The patient was seen in labor room. Discussion of failed induction and fetal intolerance to labor was discussed. The risks, benefits, complications, treatment options, and expected outcomes were discussed with the patient.  The patient concurred with the proposed plan, giving informed consent.  The patient was taken to Operating Room, identified as Rosario Barrera and the procedure verified as Repeat  Delivery with bilateral tubal ligation. A Time Out was held and the above information confirmed.    After induction of anesthesia, the patient was prepped and draped in the usual sterile manner while placed in a dorsal supine position with a left lateral tilt.  A sanchez catheter was  also placed per nursing. Preoperative antibiotics Ancef 2 g and azithromycin 500 mg were administered. An allis test was performed confirming adequate anesthesia.  A Pfannenstiel incision was made and carried down through the subcutaneous tissue to the fascia. Fascial incision was made and extended transversely. The fascia was grasped with [facility] clamps and  from the underlying rectus tissue superiorly and inferiorly. The peritoneum was identified, found to be free of adherent bowel, and entered sharply. Peritoneal incision was extended longitudinally. The vesico-uterine peritoneum was identified, and bladder blade was inserted. The vesico-uterine peritoneum was incised transversely and the bladder flap was bluntly freed from the lower uterine segment. The bladder blade was reinserted to keep the bladder out of the operative field. A low transverse uterine incision was made with knife and extended with finger fracture. The infant was noted to be in cephalic presentation. The fetal head was noted to be low in the pelvis. At that time assistance from nursing was called for to push the fetal head up through the vaginal. The head was brought to the incision and elevated out of the pelvis. The patient delivered a single viable male infant without difficulty.  Infant weighed 3730 grams with Apgar scores of 8/9 at one and five minutes respectively. After the umbilical cord was clamped and cut, cord blood was obtained for evaluation. The placenta was removed intact, appeared normal, and was discarded. The uterus was exteriorized. Atony was noted and pitocin (extra 20 units) and methergine were given. The uterine incision was closed with running locked sutures of chromic. Hemostasis was observed. However, the bladder peritoneum was noted to be close to the hysterotomy closure. Nursing was asked to insufflate the bladder with 180cc of infant formula through the sanchez catheter. No extravasation was appreciated. The  uterine outline, tubes and ovaries appeared normal. [place] procedure was performed bilaterally with hemostasis appreciated. The tube segments were passed off for pathological. The uterus was returned to the abdominal cavity. Incision was reinspected and good hemostasis was noted. The abdominal cavity was irrigated to remove clots. The peritoneum and rectus muscle were re approximated en bloc with chromic suture. The fascia was then reapproximated with running sutures of PDS. The skin was reapproximated with 4-0 monocryl.    Instrument, sponge, and needle counts were correct prior the abdominal closure and at the conclusion of the case.     Pt tolerated procedure well and was in stable condition after the procedure.      **NOTE: This patient NOT a candidate for trial of labor after  delivery**      Delivery Information for  John Barrera    Birth information:  YOB: 2016   Time of birth: 6:34 AM   Sex: male   Head Delivery Date/Time: 10/10/2017  6:34 AM   Delivery type: , Low Transverse   Gestational Age: 39w5d    Delivery Providers    Delivering clinician:  Yasmin Rodriguez DO   Other personnel:   Provider Role   Eboni Amaya MD Resident   Truong Barillas, RN Registered Nurse   Jackelyn Bose Technician   [facility] Technician               [place] Measurements    Weight:  3730 g Length:  53.3 cm   Head circum.:  34.3 cm Chest circum.:  35.6 cm          [place] Assessment    Living status:  Living  Apgars:     1 Minute:   5 Minute:   10 Minute:   15 Minute:   20 Minute:     Skin Color:   0  1       Heart Rate:   2  2       Reflex Irritability:   2  2       Muscle Tone:   2  2       Respiratory Effort:   2  2       Total:   8  9               Apgars Assigned By:  NICU         Assisted Delivery Details:    Forceps attempted?:  No  Vacuum extractor attempted?:  No         Shoulder Dystocia    Shoulder dystocia present?:  No           Presentation  and Position    Presentation:   Transverse   Position:                   Interventions/Resuscitation    Method:  NICU Attended       Cord    Vessels:  3 vessels  Complications:  None  Delayed Cord Clamping?:  No  Cord Clamped Date/Time:  10/10/2017  6:34 AM  Cord Blood Disposition:  Lab, Sent with Baby  Gases Sent?:  Yes  Stem Cell Collection (by MD):  No       Placenta    Date and time:  10/10/2017  6:35 AM  Removal:  Manual removal  Appearance:  Intact  Placenta disposition:  discarded           Labor Events:       labor: No     Labor Onset Date/Time:         Dilation Complete Date/Time:         Start Pushing Date/Time:       Rupture Date/Time:              Rupture type:           Fluid Amount:        Fluid Color:        Fluid Odor:        Membrane Status (PeriCalm):        Rupture Date/Time (PeriCalm):        Fluid Amount (PeriCalm):        Fluid Color (PeriCalm):         steroids: None     Antibiotics given for GBS: No     Induction: none     Indications for induction:        Augmentation:       Indications for augmentation:       Labor complications: Fetal Intolerance     Additional complications:          Cervical ripening:                     Delivery:      Episiotomy: None     Indication for Episiotomy:       Perineal Lacerations: None Repaired:      Periurethral Laceration: none Repaired:     Labial Laceration: none Repaired:     Sulcus Laceration: none Repaired:     Vaginal Laceration: No Repaired:     Cervical Laceration: No Repaired:     Repair suture:       Repair # of packets: 8     Vaginal delivery QBL (mL): 0      QBL (mL): 0     Combined Blood Loss (mL): 0     Vaginal Sweep Performed: No     Surgicount Correct: Yes       Other providers:       Anesthesia    Method:  Epidural          Details (if applicable):  Trial of Labor Yes    Categorization: Repeat    Priority: Routine   Indications for : Fetal Intolerance of Labor   Incision Type:  low transverse     Additional  information:  Forceps:    Vacuum:    Breech:    Observed anomalies    Other (Comments):

## 2017-10-10 NOTE — ANESTHESIA PROCEDURE NOTES
Epidural    Patient location during procedure: OB   Reason for block: primary anesthetic   Diagnosis: Active Labor   Start time: 10/10/2017 3:25 AM  Timeout: 10/10/2017 3:25 AM  End time: 10/10/2017 3:45 AM  Surgery related to: Vaginal Delivery  Staffing  Anesthesiologist: MARSHA SOLANO  Resident/CRNA: SIGRID VALDEZ  Performed: resident/CRNA   Preanesthetic Checklist  Completed: patient identified, site marked, surgical consent, pre-op evaluation, timeout performed, IV checked, risks and benefits discussed, monitors and equipment checked, anesthesia consent given, hand hygiene performed and patient being monitored  Preparation  Patient position: sitting  Prep: ChloraPrep  Patient monitoring: Pulse Ox and Blood Pressure  Epidural  Skin Anesthetic: lidocaine 1%  Skin Wheal: 3 mL  Administration type: continuous  Approach: midline  Interspace: L3-4  Injection technique: TIO saline  Needle and Epidural Catheter  Needle type: Tuohy   Needle gauge: 17  Needle length: 3.5 inches  Needle insertion depth: 7.5 cm  Catheter type: springwound and multi-orifice  Catheter size: 19 G  Catheter at skin depth: 12 cm  Test dose: 3 mL of lidocaine 1.5% with Epi 1-to-200,000  Additional Documentation: incremental injection, negative aspiration for heme and CSF, no paresthesia on injection, no signs/symptoms of IV or SA injection, no significant pain on injection and no significant complaints from patient  Needle localization: anatomical landmarks  Medications:  Bolus administered: 10 mL of 0.125% bupivacaine  Volume per aspiration: 5 mL  Time between aspirations: 5 minutes  Assessment   Dermatomal levels determined by ice  Ease of block: easy  Patient's tolerance of the procedure: comfortable throughout block and no complaints

## 2017-10-10 NOTE — TRANSFER OF CARE
"Anesthesia Transfer of Care Note    Patient: Rosario Barrera    Procedure(s) Performed: * No procedures listed *    Patient location: Labor and Delivery    Anesthesia Type: epidural    Transport from OR: Transported from OR on room air with adequate spontaneous ventilation    Post pain: adequate analgesia    Post assessment: no apparent anesthetic complications    Post vital signs: stable    Level of consciousness: awake    Nausea/Vomiting: nausea and no vomiting    Complications: none    Transfer of care protocol was followed      Last vitals:   Visit Vitals  /77   Pulse 82   Temp 37.1 °C (98.7 °F) (Temporal)   Resp 18   Ht 5' 10" (1.778 m)   Wt 93 kg (205 lb)   LMP 01/05/2017   SpO2 100%   Breastfeeding? No   BMI 29.41 kg/m²     "

## 2017-10-10 NOTE — PROGRESS NOTES
"LABOR NOTE    S: 32 y.o.  at 39w5d with EDC of 10/12/2017, by Last Menstrual Period, TOLAC. Comfortable with epidural. MD to  Bedside for recurrent late decels.    O:   /60 (BP Location: Right arm, Patient Position: Lying)   Pulse 82   Temp 98.7 °F (37.1 °C) (Temporal)   Resp 18   Ht 5' 10" (1.778 m)   Wt 93 kg (205 lb)   LMP 2017   SpO2 98%   Breastfeeding? No   BMI 29.41 kg/m²     FHT: 135, mod BTBV, - accels, + late decels with almost each contraction, Cat 2  [place]/IUPC: Q2 min  SVE: /-2      ASSESSMENT:  32 y.o.  at 39w5d with  Active Hospital Problems    Diagnosis  POA    Indication for care in labor or delivery [O75.9]  Yes    Hepatitis C [B19.20]  Yes    History of gestational hypertension [Z87.59]  Not Applicable    History of twin pregnancy in prior pregnancy [Z87.59]  Not Applicable     delivery delivered [O82]  Yes    Drug abuse (heroin/pills) - on subu 8mg per day- followed by Dr Lacey Douglas in [place] [F19.10]  Yes      Resolved Hospital Problems    Diagnosis Date Resolved POA   No resolved problems to display.       PLAN:    Continue Close Maternal/Fetal Monitoring  Intrauterine resuscitation measures employed as patient making cervical change on her own  If tracing continues to have recurrent lates despite all resuscitation measures, would recommend delivery via repeat  for fetal indications. Discussed with Dr. Rodriguez and patient who also agree.  Recheck cervix 2 hours or PRN  Closely monitor FHT    Jeanie Nicholas MD  OBGYN - PGY 3     "

## 2017-10-10 NOTE — ED PROVIDER NOTES
Encounter Date: 10/10/2017       History     Chief Complaint   Patient presents with    Contractions     Rosario Barrera is a 32 y.o. [id number] at 39w5d presents complaining of contractions, non-painful, every 3-5 minutes.   Patient reports contractions, denies vaginal bleeding, denies LOF.   Fetal Movement: normal.  This IUP is complicated by history of maternal drug abuse (on suboxone), and maternal Hepatitis C recently diagnosed.          Review of patient's allergies indicates:   Allergen Reactions    Ceclor [cefaclor] Rash    Sulfa (sulfonamide antibiotics) Rash     Past Medical History:   Diagnosis Date    Eczema      Past Surgical History:   Procedure Laterality Date    APPENDECTOMY      DILATION AND CURETTAGE OF UTERUS       Family History   Problem Relation Age of Onset    Breast cancer Neg Hx     Colon cancer Neg Hx     Ovarian cancer Neg Hx      Social History   Substance Use Topics    Smoking status: Never Smoker    Smokeless tobacco: Never Used    Alcohol use No     Review of Systems   Constitutional: Negative for chills and fever.   Eyes: Negative for visual disturbance.   Respiratory: Negative for cough and shortness of breath.    Cardiovascular: Negative for chest pain and palpitations.   Gastrointestinal: Positive for abdominal pain. Negative for constipation, diarrhea, nausea and vomiting.   Genitourinary: Negative for dysuria, vaginal bleeding and vaginal discharge.   Musculoskeletal: Positive for back pain.   Skin: Negative for rash.   Neurological: Negative for light-headedness and headaches.   Hematological: Does not bruise/bleed easily.   Psychiatric/Behavioral: The patient is not nervous/anxious.        Physical Exam     Initial Vitals [10/10/17 0030]   BP Pulse Resp Temp SpO2   117/65 80 18 97.6 °F (36.4 °C) 98 %      MAP       82.33         Physical Exam    Vitals reviewed.  Constitutional: She appears well-developed and well-nourished. She is not diaphoretic. No  distress.   HENT:   Head: Normocephalic and atraumatic.   Eyes: EOM are normal.   Neck: Normal range of motion.   Cardiovascular: Normal rate, regular rhythm, normal heart sounds and intact distal pulses.   Pulmonary/Chest: Breath sounds normal. No respiratory distress.   Abdominal: Soft. There is no tenderness. There is no rebound and no guarding.   Gravid    Musculoskeletal: Normal range of motion.   Neurological: She is alert and oriented to person, place, and time. She has normal strength. No sensory deficit.   Skin: Skin is warm and dry.   Psychiatric: She has a normal mood and affect. Her behavior is normal. Judgment and thought content normal.     OB LABOR EXAM:             Dilatation: 1.   Station: -3.   Effacement: 50%.       Comments: NST started,        ED Course   Fetal non-stress test  Date/Time: 10/13/2017 7:21 AM  Performed by: PHIL PATEL  Authorized by: TUCKER GRIFFITH     Nonstress Test:     Variability:  6-25 BPM    Decelerations: Prior to transfer to L&D patient began having late decelerations.    Accelerations:  15 bpm    Baseline:  140    Contractions:  Regular    Contraction Frequency:  4 min  Biophysical Profile:     Nonstress Test Interpretation: reactive      Overall Impression:  Reassuring  Post-procedure:     Patient tolerance:  Patient tolerated the procedure well with no immediate complications     Fetal tracing while in [facility] reassuring, right before transfer to L&D floor - patient began having late decelerations, recurrent      Labs Reviewed - No data to display     NST: baseline 140, + accels, - decels. Mod btbv. Cat 1, reassuring.     Medical Decision Making:   Initial Assessment:   33 yo  at 39w5d presents with contractions  Differential Diagnosis:   Labor  ED Management:  NST started on arrival, reactive and reassuring.  SVE 50/-3.  PO hydration.  Patient noted to have ROM with thick mec while in [facility], contractions becoming stronger and more frequent  following ROM.  SVE recheck 3/80/-2    Discussed with patient that she is a good candidate for  if she would like to try. Patient would like to attempt .  Admitted to L&D - given stadol while waiting for transfer to L&D    Other:   I have discussed this case with another health care provider.              Attending Attestation:   Physician Attestation Statement for Resident:  As the supervising MD   Physician Attestation Statement: I have personally seen and examined this patient.   I agree with the above history. -:   As the supervising MD I agree with the above PE.    As the supervising MD I agree with the above treatment, course, plan, and disposition.   -: Patient evaluated and found to be stable, agree with resident's assessment and plan.  I was personally present during the critical portions of the procedure(s) performed by the resident and was immediately available in the ED to provide services and assistance as needed during the entire procedure.  I have reviewed and agree with the residents interpretation of the following: rhythm strips.  I have reviewed the following: old records at this facility.                    ED Course as of Oct 10 0654   Tue Oct 10, 2017   0142 ROM with [facility], recheck cervical exam, patient undecided if desires repeat C/S versus   [LB]   0239 3/80/-2  Discussed good candidate for , patient desires to try.  Awaiting bed on L&D - Stadol IV in the interim    [LB]      ED Course User Index  [LB] Fabi Lopez MD     Clinical Impression:   The primary encounter diagnosis was S/P repeat low transverse . A diagnosis of Indication for care in labor or delivery was also pertinent to this visit.    Disposition:   Disposition: Admitted  Condition: Stable                        Fabi Lopez MD  10/13/17 0724

## 2017-10-10 NOTE — SUBJECTIVE & OBJECTIVE
Obstetric History       T1      L2     SAB0   TAB0   Ectopic0   Multiple1   Live Births2       # Outcome Date GA Lbr José/2nd Weight Sex Delivery Anes PTL Lv   3 Current            2A Term 16 37w1d  2.9 kg (6 lb 6.3 oz) M CS-LTranv Spinal N TONNY      Apgar1:  9                Apgar5: 9   2B Term 16 37w1d  2.311 kg (5 lb 1.5 oz) M CS-LTranv Spinal N TONNY      Apgar1:  9                Apgar5: 9   1 2012 9w0d               Past Medical History:   Diagnosis Date    Eczema      Past Surgical History:   Procedure Laterality Date    APPENDECTOMY      DILATION AND CURETTAGE OF UTERUS         PTA Medications   Medication Sig    acetaminophen (TYLENOL) 500 MG tablet Take 1 tablet (500 mg total) by mouth every 6 (six) hours as needed.    ferrous sulfate 325 mg (65 mg iron) Tab tablet Take 1 tablet (325 mg total) by mouth once daily.    SUBOXONE 8-2 mg Film        Review of patient's allergies indicates:   Allergen Reactions    Ceclor [cefaclor] Rash    Sulfa (sulfonamide antibiotics) Rash        Family History     None        Social History Main Topics    Smoking status: Never Smoker    Smokeless tobacco: Never Used    Alcohol use No    Drug use:      Types: Heroin      Comment: on subutex     Sexual activity: Yes     Partners: Male     Review of Systems   Constitutional: Negative for chills and fever.   Respiratory: Negative for shortness of breath.    Cardiovascular: Negative for chest pain.   Gastrointestinal: Positive for abdominal pain (ctx).   Genitourinary: Positive for vaginal discharge (thick meconium). Negative for vaginal bleeding.   Neurological: Negative for headaches.      Objective:     Vital Signs (Most Recent):  Temp: 98.7 °F (37.1 °C) (10/10/17 0304)  Pulse: 82 (10/10/17 0600)  Resp: 18 (10/10/17 0030)  BP: 131/77 (10/10/17 0600)  SpO2: 100 % (10/10/17 0600) Vital Signs (24h Range):  Temp:  [97.6 °F (36.4 °C)-98.7 °F (37.1 °C)] 98.7 °F (37.1 °C)  Pulse:  [74-91]  82  Resp:  [18] 18  SpO2:  [96 %-100 %] 100 %  BP: (117-146)/(60-77) 131/77     Weight: 93 kg (205 lb)  Body mass index is 29.41 kg/m².    FHT: 140, Cat 1 (reassuring)  TOCO:  Q 2-3 minutes    Physical Exam:   Constitutional: She is oriented to person, place, and time. She appears well-developed and well-nourished. She appears distressed.    HENT:   Head: Normocephalic and atraumatic.    Eyes: EOM are normal.    Neck: Normal range of motion.    Cardiovascular: Normal rate.     Pulmonary/Chest: Effort normal. No respiratory distress.        Abdominal:   Gravid                  Neurological: She is alert and oriented to person, place, and time.    Skin: Skin is warm and dry. She is not diaphoretic.    Psychiatric: She has a normal mood and affect. Her behavior is normal. Judgment and thought content normal.       Cervix:  Dilation:  3  Effacement:  70  Station: -2  Presentation: Vertex     Significant Labs:  Lab Results   Component Value Date    GROUPTRH A POS 10/10/2017    HEPBSAG Negative 09/14/2017    HEPBSAG Negative 09/14/2017    STREPBCULT No Group B Streptococcus isolated 09/21/2017       I have personallly reviewed all pertinent lab results from the last 24 hours.

## 2017-10-10 NOTE — H&P
HISTORY AND PHYSICAL                                                OBSTETRICS          Subjective:       Rosario Barrera is a 32 y.o.  female with IUP at 39w5d weeks gestation who c/o contractions. Contractions have been occuring Q2-3 min and have increased in intensity. SROM in triage at 0040, thick mec. Denies VB. Good FM.    Cervix changed from 1 to 3 cm in triage, desires TOLAC.    This IUP is complicated by hepC (new diagnosis, viral load 128K), h/o opiate use -on suboxone, h/o prior c/s x1 for breech twins -LTCS verified, h/o GHTN in prior pregnancy.     PMHx:   Past Medical History:   Diagnosis Date    Eczema        PSHx:   Past Surgical History:   Procedure Laterality Date    APPENDECTOMY      DILATION AND CURETTAGE OF UTERUS         All:   Review of patient's allergies indicates:   Allergen Reactions    Ceclor [cefaclor] Rash    Sulfa (sulfonamide antibiotics) Rash       Meds:   Prescriptions Prior to Admission   Medication Sig Dispense Refill Last Dose    acetaminophen (TYLENOL) 500 MG tablet Take 1 tablet (500 mg total) by mouth every 6 (six) hours as needed. 20 tablet 0 Taking    ferrous sulfate 325 mg (65 mg iron) Tab tablet Take 1 tablet (325 mg total) by mouth once daily. 30 tablet 3 Taking    SUBOXONE 8-2 mg Film   0 Taking       SH:   Social History     Social History    Marital status: Single     Spouse name: N/A    Number of children: N/A    Years of education: N/A     Occupational History    Not on file.     Social History Main Topics    Smoking status: Never Smoker    Smokeless tobacco: Never Used    Alcohol use No    Drug use:      Types: Heroin      Comment: on subutex     Sexual activity: Yes     Partners: Male     Other Topics Concern    Not on file     Social History Narrative    No narrative on file       FH:   Family History   Problem Relation Age of Onset    Breast cancer Neg Hx     Colon cancer Neg Hx     Ovarian cancer Neg Hx        OBHx:  "  Obstetric History       T1      L2     SAB0   TAB0   Ectopic0   Multiple1   Live Births2       # Outcome Date GA Lbr José/2nd Weight Sex Delivery Anes PTL Lv   3 Current            2A Term 16 37w1d  2.9 kg (6 lb 6.3 oz) M CS-LTranv Spinal N TONNY      Apgar1:  9                Apgar5: 9   2B Term 16 37w1d  2.311 kg (5 lb 1.5 oz) M CS-LTranv Spinal N TONNY      Apgar1:  9                Apgar5: 9   1 SAB  9w0d                 Objective:       /60 (BP Location: Right arm, Patient Position: Lying)   Pulse 82   Temp 98.7 °F (37.1 °C) (Temporal)   Resp 18   Ht 5' 10" (1.778 m)   Wt 93 kg (205 lb)   LMP 2017   SpO2 98%   Breastfeeding? No   BMI 29.41 kg/m²     Vitals:    10/10/17 0233 10/10/17 0238 10/10/17 0300 10/10/17 0304   BP:    123/60   BP Location:    Right arm   Patient Position:    Lying   Pulse: 80 91  82   Resp:       Temp:    98.7 °F (37.1 °C)   TempSrc:    Temporal   SpO2: 99% 98%  98%   Weight:   93 kg (205 lb)    Height:   5' 10" (1.778 m)        General:   alert, appears stated age, cooperative and mild distress   Lungs:   clear to auscultation bilaterally   Heart:   regular rate and rhythm, S1, S2 normal, no murmur, click, rub or gallop   Abdomen:  soft, non-tender; bowel sounds normal; no masses,  no organomegaly   Extremities negative edema, negative erythema   FHT: Cat 1 (reassuring)                 TOCO: Q 2-3 minutes   Presentations: cephalic by ultrasound   Cervix:     Dilation: 3cm    Effacement: 70    Station:  -2    Consistency: medium    Position: middle     Lab Review  Blood Type A POS  GBBS: negative  Rubella: Immune  RPR: NR  HIV: negative  HepB: negative       Assessment:       39w5d weeks gestation, TOLAC with thick mec    Active Hospital Problems    Diagnosis  POA    Indication for care in labor or delivery [O75.9]  Yes    Hepatitis C [B19.20]  Yes    History of gestational hypertension [Z87.59]  Not Applicable    History of twin " pregnancy in prior pregnancy [Z87.59]  Not Applicable     delivery delivered [O82]  Yes    Drug abuse (heroin/pills) - on subu 8mg per day- followed by Dr Lacey Douglas in [place] [F19.10]  Yes      Resolved Hospital Problems    Diagnosis Date Resolved POA   No resolved problems to display.          Plan:      Risks, benefits, alternatives and possible complications have been discussed in detail with the patient.   - Consents signed and to chart  - Admit to Labor and Delivery unit  - Expectant labor management   - Epidural per Anesthesia  - NICU to attend delivery due to thick mec  - Draw CBC, T&S  - Notify Staff  - Recheck in 2 hrs or PRN    Post-Partum Hemorrhage risk - medium     Hep C  - avoid internal monitoring    Undesired fertility  - medicaid papers signed 17  - consents for BTL signed and to chart    Jeanie Nicholas MD  OBGYN - PGY 3

## 2017-10-10 NOTE — ASSESSMENT & PLAN NOTE
- Consents signed and to chart  - Admit to Labor and Delivery unit  - Plan for expectant labor management    - Epidural per Anesthesia  - Draw CBC, T&S  - Notify Staff  - Ultrasound performed, fetus in vertex position.  - Recheck in 2 hrs or PRN  - Post-Partum Hemorrhage risk - medium     - medicaid papers signed 9/14/17  - consents for BTL signed and to chart

## 2017-10-10 NOTE — HPI
Rosario Barrera is a 32 y.o. [id number] at 39w5d presents complaining of contractions.  In the [facility], patient noted ROM with thick meconium and then contractions increased in frequency and painfulness. Patient has a history of CS x 1 for twin gestation and would like to consider .  This IUP is complicated by maternal hepatitis C, history of maternal drug use on suboxone, and undesired fertility.

## 2017-10-11 PROBLEM — D64.9 ANEMIA: Status: ACTIVE | Noted: 2017-10-11

## 2017-10-11 PROCEDURE — 25000003 PHARM REV CODE 250: Performed by: STUDENT IN AN ORGANIZED HEALTH CARE EDUCATION/TRAINING PROGRAM

## 2017-10-11 PROCEDURE — 11000001 HC ACUTE MED/SURG PRIVATE ROOM

## 2017-10-11 PROCEDURE — 63600175 PHARM REV CODE 636 W HCPCS: Performed by: PAIN MEDICINE

## 2017-10-11 PROCEDURE — 99232 SBSQ HOSP IP/OBS MODERATE 35: CPT | Mod: ,,, | Performed by: OBSTETRICS & GYNECOLOGY

## 2017-10-11 RX ORDER — ACETAMINOPHEN 325 MG/1
650 TABLET ORAL EVERY 6 HOURS PRN
Status: DISCONTINUED | OUTPATIENT
Start: 2017-10-11 | End: 2017-10-14 | Stop reason: HOSPADM

## 2017-10-11 RX ADMIN — ACETAMINOPHEN 650 MG: 325 TABLET ORAL at 08:10

## 2017-10-11 RX ADMIN — ACETAMINOPHEN 650 MG: 325 TABLET ORAL at 01:10

## 2017-10-11 RX ADMIN — IBUPROFEN 600 MG: 600 TABLET, FILM COATED ORAL at 04:10

## 2017-10-11 RX ADMIN — BUPRENORPHINE HYDROCHLORIDE 2 MG: 2 TABLET SUBLINGUAL at 11:10

## 2017-10-11 RX ADMIN — BUPRENORPHINE HYDROCHLORIDE 2 MG: 2 TABLET SUBLINGUAL at 05:10

## 2017-10-11 RX ADMIN — BUPRENORPHINE HYDROCHLORIDE 2 MG: 2 TABLET SUBLINGUAL at 12:10

## 2017-10-11 RX ADMIN — IBUPROFEN 600 MG: 600 TABLET, FILM COATED ORAL at 09:10

## 2017-10-11 RX ADMIN — ACETAMINOPHEN 650 MG: 325 TABLET ORAL at 05:10

## 2017-10-11 RX ADMIN — KETOROLAC TROMETHAMINE 30 MG: 30 INJECTION, SOLUTION INTRAMUSCULAR at 01:10

## 2017-10-11 RX ADMIN — ACETAMINOPHEN 650 MG: 325 TABLET ORAL at 12:10

## 2017-10-11 NOTE — PLAN OF CARE
Problem: Patient Care Overview  Goal: Plan of Care Review  Outcome: Ongoing (interventions implemented as appropriate)  To breastfeed baby 8x or more in 24 hours, feed on cue. To practice correct latch and positioning with breast compression during feeding and skin to skin during feeding.  To observe for signs of milk transfer during feeding.  To call for further breastfeeding assistance/ support if needed.

## 2017-10-11 NOTE — LACTATION NOTE
"   10/11/17 1700   Maternal Medical Surgical History   Behavioral Health Disorder addiction  (Heroin )   Maternal Infant Assessment   Breast Shape round   Breast Density soft   Areola elastic   Nipple Symptoms ( L Nipples are intact)   Infant Assessment   Medical Condition other (see comments)   Sucking Reflex present   Rooting Reflex present   Swallow Reflex present   LATCH Score   Latch 1-->repeated attempts, holds nipple in mouth, stimulate to suck   Audible Swallowing 1-->a few with stimulation   Type Of Nipple 2-->everted (after stimulation)   Comfort (Breast/Nipple) 2-->soft/nontender   Hold (Positioning) 0-->full assist (staff holds infant at breast)   Score (less than 7 for 2/more consecutive times, consult Lactation Consultant) 6   Maternal Infant Feeding   Maternal Emotional State assist needed;relaxed   Infant Positioning clutch/"football"   Signs of Milk Transfer audible swallow   Time Spent (min) 0-15 min   Latch Assistance yes   Feeding Infant   Feeding Readiness Cues rooting;hand to mouth movements;eager  (Baby fussy at the breast. Took several attempts to get baby )   Audible Swallow yes   Lactation Referrals   Lactation Consult Breastfeeding assessment;Follow up;Knowledge deficit   Lactation Interventions   Attachment Promotion breastfeeding assistance provided;skin-to-skin contact encouraged   Breastfeeding Assistance assisted with positioning;infant latch-on verified;infant suck/swallow verified;support offered;feeding cue recognition promoted;feeding on demand promoted;feeding session observed   Maternal Breastfeeding Support lactation counseling provided   Latch Promotion positioning assisted;infant moved to breast   Baby very fussy at the breast. Took several attempts to finally get baby to the breast. Some swallows heard. Dad at bedside. Did not discuss drug use with mother since I am not sure of what dad knows. RN states MD aware of baby being positive for opiates.  "

## 2017-10-11 NOTE — ASSESSMENT & PLAN NOTE
Postpartum care:  - Patient doing well. Continue routine management and advances.  - Transition to PO pain meds. Pain well controlled.  - Encourage ambulation.   - Heme: Pre Delivery h/h 10/32 --> Post Delivery h/h 7.5/25.3  - Circumcision - Consents signed and order placed  - Contraception - Will defer to Dr. Ash  - Lactation - The patient is breast feeding. Lactation nurse following along PRN  - Rh Status - A POS

## 2017-10-11 NOTE — PLAN OF CARE
Copied from baby's chart:  ____________________________________    Both pt and mom utox resulted + opiates therefore DHS report called into [facility] hotline [phone] . Report taken by Yuridia AQUINO Written reports are not required by [facility].     Nohemi Wilkerson LCSW     [facility]  [email or web address]     (phone) [phone] or  [place]. [phone]  (fax) [phone]

## 2017-10-11 NOTE — LACTATION NOTE
Seen pt for breastfeeding assessment and counseling. Confirmed with peds NP Brianna if ok to breastfeed with presumptive positive opiate urine test result. Assisted pt in latching, baby was very fussy on the breast, pt was using pacifier during the visit and counseled the pt accordingly on pacifier risk, but she said the baby was very fussy. Assisted baby on latching, would stay for couple of minutes but was on and off. Discussed breast compression which helped the baby to sustain the latch.  Basics of breastfeeding discussed. Pt verbalized understanding. Gave lactation number to call for assistance.      10/10/17 1400   Maternal Infant Assessment   Breast Shape Bilateral:;pendulous   Breast Density Bilateral:;soft   Areola Bilateral:;elastic   Nipple(s) Bilateral:;everted;other (see comments)  (almost telescope when compressed)   Infant Assessment   Sucking Reflex present   Rooting Reflex present   Swallow Reflex present   LATCH Score   Latch 1-->repeated attempts, holds nipple in mouth, stimulate to suck   Audible Swallowing 1-->a few with stimulation   Type Of Nipple 2-->everted (after stimulation)   Comfort (Breast/Nipple) 2-->soft/nontender   Hold (Positioning) 1-->minimal assist, teach one side: mother does other, staff holds   Score (less than 7 for 2/more consecutive times, consult Lactation Consultant) 7   Maternal Infant Feeding   Maternal Emotional State assist needed   Time Spent (min) 15-30 min   Latch Assistance yes   Breastfeeding Education adequate infant intake;importance of skin-to-skin contact;milk expression, hand   Feeding Infant   Feeding Tolerance/Success averts head;refusal to suck   Skin-to-Skin Contact During Feeding yes   Lactation Referrals   Lactation Consult Breastfeeding assessment;Initial assessment   Lactation Interventions   Attachment Promotion breastfeeding assistance provided;counseling provided;skin-to-skin contact encouraged   Breastfeeding Assistance assisted with  positioning;support offered   Maternal Breastfeeding Support lactation counseling provided   Latch Promotion positioning assisted

## 2017-10-11 NOTE — ANESTHESIA POSTPROCEDURE EVALUATION
"Anesthesia Post Evaluation    Patient: Rosario Barrera    Procedure(s) Performed: Procedure(s) (LRB):  DELIVERY- SECTION (N/A)    Final Anesthesia Type: epidural  Patient location during evaluation: labor & delivery  Patient participation: Yes- Able to Participate  Level of consciousness: awake and alert and oriented  Post-procedure vital signs: reviewed and stable  Pain management: adequate  Airway patency: patent  PONV status at discharge: No PONV  Anesthetic complications: no      Cardiovascular status: blood pressure returned to baseline  Respiratory status: room air, unassisted and spontaneous ventilation  Hydration status: euvolemic  Follow-up not needed.        Visit Vitals  /75   Pulse 70   Temp 36.9 °C (98.5 °F) (Oral)   Resp 18   Ht 5' 10" (1.778 m)   Wt 93 kg (205 lb)   LMP 2017   SpO2 98%   Breastfeeding? Yes   BMI 29.41 kg/m²       Pain/Drew Score: Pain Rating Prior to Med Admin: 6 (10/11/2017 11:13 AM)  Pain Rating Post Med Admin: 8 (10/10/2017 11:15 AM)      "

## 2017-10-11 NOTE — SUBJECTIVE & OBJECTIVE
Hospital course: 10/10/2017 - patient admitted from [facility] with ROM, thick meconium, and contractions in labor. Desired . Fetus was noted to not be tolerating labor with late decelerations so the decision was made to take the patient to the OR for a repeat Cs. RLTCS & BTL performed without complication.  10/11/2017 - POD # 1 s/p RLTCS + BTL - Patient doing well. No complaints.  Starting to meet PP goals.    Interval History:     She is doing well this morning. She is tolerating a regular diet without nausea or vomiting. She is not voiding spontaneously, sanchez in place, will remove this morning. She is ambulating. She has passed flatus, and has not a BM. Vaginal bleeding is mild. She denies fever or chills. Abdominal pain is moderate and controlled with oral medications. She is breastfeeding. She desires circumcision for her male baby: yes.    Objective:     Vital Signs (Most Recent):  Temp: 98.6 °F (37 °C) (10/11/17 0425)  Pulse: 80 (10/11/17 0425)  Resp: 18 (10/11/17 0425)  BP: 124/68 (10/11/17 0425)  SpO2: 98 % (10/11/17 0425) Vital Signs (24h Range):  Temp:  [98 °F (36.7 °C)-99 °F (37.2 °C)] 98.6 °F (37 °C)  Pulse:  [71-87] 80  Resp:  [18] 18  SpO2:  [98 %-99 %] 98 %  BP: (109-124)/(60-70) 124/68     Weight: 93 kg (205 lb)  Body mass index is 29.41 kg/m².      Intake/Output Summary (Last 24 hours) at 10/11/17 0856  Last data filed at 10/11/17 0530   Gross per 24 hour   Intake                0 ml   Output             1875 ml   Net            -1875 ml       Significant Labs:  Lab Results   Component Value Date    GROUPTRH A POS 10/10/2017    HEPBSAG Negative 2017    HEPBSAG Negative 2017    STREPBCULT No Group B Streptococcus isolated 2017       Recent Labs  Lab 10/10/17  1853   HGB 7.8*   HCT 25.3*       I have personallly reviewed all pertinent lab results from the last 24 hours.    Physical Exam:   Constitutional: She is oriented to person, place, and time. She appears well-developed and  well-nourished. No distress.    HENT:   Head: Normocephalic and atraumatic.    Eyes: Conjunctivae are normal.    Neck: Neck supple.    Cardiovascular: Normal rate.     Pulmonary/Chest: Effort normal. No respiratory distress.        Abdominal: She exhibits distension (Appropriate for PP Period). There is tenderness (Appropriate for PP Period). There is no rebound and no guarding.     Genitourinary: Vagina normal.   Genitourinary Comments: Levy in place               Neurological: She is alert and oriented to person, place, and time.    Skin: Skin is warm and dry. She is not diaphoretic.    Psychiatric: She has a normal mood and affect. Her behavior is normal. Judgment and thought content normal.

## 2017-10-11 NOTE — LACTATION NOTE
LC first visit with mother. Reviewed breastfeeding basics and made sure the mother had the Mother's Breastfeeding Guide. LC reviewed the guide with mother and showed her how to use it to guide her breastfeeding journey. Offered to asst mother with feeding. LC left contact number placed on white board and mother told to please call LC when ready to breastfeeding so LC may asst.

## 2017-10-11 NOTE — PROGRESS NOTES
[facility]  Obstetrics  Postpartum Progress Note    Patient Name: Rosario Barrera  MRN: [id number]  Admission Date: 10/10/2017  Hospital Length of Stay: 1 days  Attending Physician: Fariba Ash MD  Primary Care Provider: Primary Doctor No    Subjective:     Principal Problem: delivery delivered    Hospital course: 10/10/2017 - patient admitted from [facility] with ROM, thick meconium, and contractions in labor. Desired . Fetus was noted to not be tolerating labor with late decelerations so the decision was made to take the patient to the OR for a repeat Cs. RLTCS & BTL performed without complication.  10/11/2017 - POD # 1 s/p RLTCS + BTL - Patient doing well. No complaints.  Starting to meet PP goals.    Interval History:     She is doing well this morning. She is tolerating a regular diet without nausea or vomiting. She is not voiding spontaneously, sanchez in place, will remove this morning. She is ambulating. She has passed flatus, and has not a BM. Vaginal bleeding is mild. She denies fever or chills. Abdominal pain is moderate and controlled with oral medications. She is breastfeeding. She desires circumcision for her male baby: yes.    Objective:     Vital Signs (Most Recent):  Temp: 98.6 °F (37 °C) (10/11/17 0425)  Pulse: 80 (10/11/17 0425)  Resp: 18 (10/11/17 0425)  BP: 124/68 (10/11/17 0425)  SpO2: 98 % (10/11/17 0425) Vital Signs (24h Range):  Temp:  [98 °F (36.7 °C)-99 °F (37.2 °C)] 98.6 °F (37 °C)  Pulse:  [71-87] 80  Resp:  [18] 18  SpO2:  [98 %-99 %] 98 %  BP: (109-124)/(60-70) 124/68     Weight: 93 kg (205 lb)  Body mass index is 29.41 kg/m².      Intake/Output Summary (Last 24 hours) at 10/11/17 0852  Last data filed at 10/11/17 0530   Gross per 24 hour   Intake                0 ml   Output             1875 ml   Net            -1875 ml       Significant Labs:  Lab Results   Component Value Date    GROUPTR A POS 10/10/2017    HEPBSAG Negative 2017     HEPBSAG Negative 2017    STREPBCULT No Group B Streptococcus isolated 2017       Recent Labs  Lab 10/10/17  1853   HGB 7.8*   HCT 25.3*       I have personallly reviewed all pertinent lab results from the last 24 hours.    Physical Exam:   Constitutional: She is oriented to person, place, and time. She appears well-developed and well-nourished. No distress.    HENT:   Head: Normocephalic and atraumatic.    Eyes: Conjunctivae are normal.    Neck: Neck supple.    Cardiovascular: Normal rate.     Pulmonary/Chest: Effort normal. No respiratory distress.        Abdominal: She exhibits distension (Appropriate for PP Period). There is tenderness (Appropriate for PP Period). There is no rebound and no guarding.     Genitourinary: Vagina normal.   Genitourinary Comments: Levy in place               Neurological: She is alert and oriented to person, place, and time.    Skin: Skin is warm and dry. She is not diaphoretic.    Psychiatric: She has a normal mood and affect. Her behavior is normal. Judgment and thought content normal.       Assessment/Plan:     32 y.o. female  for:    *  delivery delivered    Postpartum care:  - Patient doing well. Continue routine management and advances.  - Transition to PO pain meds. Pain well controlled.  - Encourage ambulation.   - Heme: Pre Delivery h/h 10/32 --> Post Delivery h/h 7..3  - Circumcision - Consents signed and order placed  - Contraception - Will defer to Dr. Ash  - Lactation - The patient is breast feeding. Lactation nurse following along PRN  - Rh Status - A POS        Anemia    - Pre Delivery h/h 10/32 --> Post Delivery h/h 7..3  - Fe and Colace        Hepatitis C    - plan for normal   - No FSE        History of gestational hypertension    - will monitor vitals closely        Drug abuse (heroin/pills) - on subu 8mg per day- followed by Dr Lacey Douglas in [place]    - on Subutex while in hospital            Disposition: As  patient meets milestones, will plan to discharge  POD 3-4.    Rozina Houser MD  Obstetrics  [facility]    Doing well, no questions,no problems.  I have reviewed the resident's note and agree with the diagnosis and management plan

## 2017-10-11 NOTE — PLAN OF CARE
"Copied from baby's chart:    SOCIAL WORK DISCHARGE PLANNING ASSESSMENT     Sw completed discharge planning assessment with pt's mother at pt's bedside.  Pt's mother was easily engaged. Education on the role of  was provided. Emotional support provided throughout assessment.     Mom reported she did not know she was pregnant until approx 21 weeks. That is why her PNC was started so late. Mom delivery her twins here 2 years ago bc she was on suboxone and the hospitals in [place] "want to hurry and put the baby in ICU." Mom did receive the little PNC received her at [facility].         Legal Name: Julio Bartlett           :  10/10/2017         Patient Active Problem List   Diagnosis    Single liveborn, born in hospital, delivered by  section    Meconium stained amniotic fluid, delivered, current hospitalization    Maternal infections affecting fetus or     Union affected by maternal use of drug of addiction            Birth Hospital:[facility]                Birth Weight:   3.73 kg (8 lb 3.6 oz)                            Gestational Age: 39w5d              Mother: Sheila Barrera,  1986, 32 y/o  Address: [address] [place] [id number]  Phone: [phone]  Employer: unknown                 Job Title: unknown     Signed Birth Certificate: FOB did sign b.c. Unsure FOBs first name but last name is Dhruv.     Support person(s): Ira Guadarrama mggm, [phone]     Sibling(s): 3 y/o twin brothers-names unknown     Insurance: [place] Medicaid      Pediatrician: Nhi Azul NP at [facility]       Nutrition: Expressed Breast Milk               Breast Pump:              No               Plans to rent from hospital               WIC:              Mom not certified; however will apply for         Essential Items: (includes car seat, crib/bassinet/pack-n-play, clothing, bottles, diapers, etc.)  Acquired      Transportation: Personal vehicle      Potential " "Discharge Needs:  DCFS report for  drug exposure.       Mom's drug history:  Mom took subutex while pregnant for her 3 y/o twins. Mom continued on suboxone after the delivery. Mom breast fed and the twins never had to go to the NICU. Mom eventually stopped taking the suboxone and relapsed shortly after. Mom was using heroin until she found out she was pregnant at approx 21 weeks. At that time, [place]. Mom was supposed to ask for subutex bc it is rec'd during pregnancy but Dr. Rahman does not Rx subutex. Mom also reported a relapse on heroin approx 3 weeks ago.          Nohemi Wilkerson LCSW     [facility]  [email or web address]     (phone) [phone] or  [place]. [phone]  (fax) [phone]                "

## 2017-10-12 PROCEDURE — 25000003 PHARM REV CODE 250: Performed by: STUDENT IN AN ORGANIZED HEALTH CARE EDUCATION/TRAINING PROGRAM

## 2017-10-12 PROCEDURE — 99233 SBSQ HOSP IP/OBS HIGH 50: CPT | Mod: ,,, | Performed by: OBSTETRICS & GYNECOLOGY

## 2017-10-12 PROCEDURE — 99232 SBSQ HOSP IP/OBS MODERATE 35: CPT | Mod: ,,, | Performed by: OBSTETRICS & GYNECOLOGY

## 2017-10-12 PROCEDURE — 11000001 HC ACUTE MED/SURG PRIVATE ROOM

## 2017-10-12 RX ADMIN — ACETAMINOPHEN 650 MG: 325 TABLET ORAL at 09:10

## 2017-10-12 RX ADMIN — IBUPROFEN 600 MG: 600 TABLET, FILM COATED ORAL at 05:10

## 2017-10-12 RX ADMIN — BUPRENORPHINE HYDROCHLORIDE 2 MG: 2 TABLET SUBLINGUAL at 12:10

## 2017-10-12 RX ADMIN — ACETAMINOPHEN 650 MG: 325 TABLET ORAL at 03:10

## 2017-10-12 RX ADMIN — BUPRENORPHINE HYDROCHLORIDE 2 MG: 2 TABLET SUBLINGUAL at 05:10

## 2017-10-12 RX ADMIN — BUPRENORPHINE HYDROCHLORIDE 2 MG: 2 TABLET SUBLINGUAL at 07:10

## 2017-10-12 RX ADMIN — IBUPROFEN 600 MG: 600 TABLET, FILM COATED ORAL at 12:10

## 2017-10-12 RX ADMIN — BUPRENORPHINE HYDROCHLORIDE 2 MG: 2 TABLET SUBLINGUAL at 01:10

## 2017-10-12 RX ADMIN — ACETAMINOPHEN 650 MG: 325 TABLET ORAL at 04:10

## 2017-10-12 RX ADMIN — IBUPROFEN 600 MG: 600 TABLET, FILM COATED ORAL at 01:10

## 2017-10-12 RX ADMIN — IBUPROFEN 600 MG: 600 TABLET, FILM COATED ORAL at 07:10

## 2017-10-12 NOTE — ASSESSMENT & PLAN NOTE
Postpartum care:  - Patient doing well. Continue routine management and advances.  - Transition to PO pain meds. Pain well controlled.  - Encourage ambulation.   - Heme: Pre Delivery h/h 10/32 --> Post Delivery h/h 7.5/25.3  - Circumcision - Completed  - Contraception -s/p BTL  - Lactation - The patient is breast feeding. Lactation nurse following along PRN  - Rh Status - A POS

## 2017-10-12 NOTE — SUBJECTIVE & OBJECTIVE
Hospital course: 10/10/2017 - patient admitted from [facility] with ROM, thick meconium, and contractions in labor. Desired . Fetus was noted to not be tolerating labor with late decelerations so the decision was made to take the patient to the OR for a repeat Cs. RLTCS & BTL performed without complication.  10/11/2017 - POD # 1 s/p RLTCS + BTL - Patient doing well. No complaints.  Starting to meet PP goals.  10/12/2017 - POD#2  s/p RLTCS + BTL - Pt continues to do well. Meeting all postop milestones.    Interval History:     She is doing well this morning. She is tolerating a regular diet without nausea or vomiting. She is not voiding spontaneously, sanchez in place, will remove this morning. She is ambulating. She has passed flatus, and has not a BM. Vaginal bleeding is mild. She denies fever or chills. Abdominal pain is moderate and controlled with oral medications. She is breastfeeding. She desires circumcision for her male baby: yes.    Objective:     Vital Signs (Most Recent):  Temp: 98.3 °F (36.8 °C) (10/11/17 2300)  Pulse: 70 (10/11/17 2300)  Resp: 18 (10/11/17 2300)  BP: 111/60 (10/11/17 2300)  SpO2: 99 % (10/11/17 2300) Vital Signs (24h Range):  Temp:  [98.3 °F (36.8 °C)-98.9 °F (37.2 °C)] 98.3 °F (36.8 °C)  Pulse:  [70-81] 70  Resp:  [18] 18  SpO2:  [99 %] 99 %  BP: (111-122)/(60-75) 111/60     Weight: 93 kg (205 lb)  Body mass index is 29.41 kg/m².      Intake/Output Summary (Last 24 hours) at 10/12/17 0705  Last data filed at 10/11/17 1349   Gross per 24 hour   Intake                0 ml   Output             1200 ml   Net            -1200 ml       Significant Labs:  Lab Results   Component Value Date    GROUPTRH A POS 10/10/2017    HEPBSAG Negative 2017    HEPBSAG Negative 2017    STREPBCULT No Group B Streptococcus isolated 2017       Recent Labs  Lab 10/10/17  1853   HGB 7.8*   HCT 25.3*       I have personallly reviewed all pertinent lab results from the last 24 hours.    Physical Exam:    Constitutional: She is oriented to person, place, and time. She appears well-developed and well-nourished. No distress.    HENT:   Head: Normocephalic and atraumatic.    Eyes: Conjunctivae are normal.    Neck: Neck supple.    Cardiovascular: Normal rate.     Pulmonary/Chest: Effort normal. No respiratory distress.        Abdominal: She exhibits distension (Appropriate for PP Period) and abdominal incision (c/d/i). There is tenderness (Appropriate for PP Period). There is no rebound and no guarding.     Genitourinary: Vagina normal.               Neurological: She is alert and oriented to person, place, and time.    Skin: Skin is warm and dry. She is not diaphoretic.    Psychiatric: She has a normal mood and affect. Her behavior is normal. Judgment and thought content normal.

## 2017-10-13 PROCEDURE — 25000003 PHARM REV CODE 250: Performed by: STUDENT IN AN ORGANIZED HEALTH CARE EDUCATION/TRAINING PROGRAM

## 2017-10-13 PROCEDURE — 99231 SBSQ HOSP IP/OBS SF/LOW 25: CPT | Mod: ,,, | Performed by: OBSTETRICS & GYNECOLOGY

## 2017-10-13 PROCEDURE — 11000001 HC ACUTE MED/SURG PRIVATE ROOM

## 2017-10-13 RX ORDER — IBUPROFEN 600 MG/1
600 TABLET ORAL EVERY 6 HOURS
Qty: 30 TABLET | Refills: 1 | Status: SHIPPED | OUTPATIENT
Start: 2017-10-13 | End: 2022-11-27

## 2017-10-13 RX ORDER — HYDROCODONE BITARTRATE AND ACETAMINOPHEN 5; 325 MG/1; MG/1
1 TABLET ORAL EVERY 4 HOURS PRN
Qty: 20 TABLET | Refills: 0 | Status: SHIPPED | OUTPATIENT
Start: 2017-10-13 | End: 2022-11-27

## 2017-10-13 RX ADMIN — IBUPROFEN 600 MG: 600 TABLET, FILM COATED ORAL at 05:10

## 2017-10-13 RX ADMIN — IBUPROFEN 600 MG: 600 TABLET, FILM COATED ORAL at 02:10

## 2017-10-13 RX ADMIN — HYDROCODONE BITARTRATE AND ACETAMINOPHEN 1 TABLET: 10; 325 TABLET ORAL at 10:10

## 2017-10-13 RX ADMIN — IBUPROFEN 600 MG: 600 TABLET, FILM COATED ORAL at 09:10

## 2017-10-13 RX ADMIN — ACETAMINOPHEN 650 MG: 325 TABLET ORAL at 05:10

## 2017-10-13 RX ADMIN — BUPRENORPHINE HYDROCHLORIDE 2 MG: 2 TABLET SUBLINGUAL at 08:10

## 2017-10-13 RX ADMIN — BUPRENORPHINE HYDROCHLORIDE 2 MG: 2 TABLET SUBLINGUAL at 11:10

## 2017-10-13 RX ADMIN — BUPRENORPHINE HYDROCHLORIDE 2 MG: 2 TABLET SUBLINGUAL at 02:10

## 2017-10-13 RX ADMIN — BUPRENORPHINE HYDROCHLORIDE 2 MG: 2 TABLET SUBLINGUAL at 05:10

## 2017-10-13 RX ADMIN — IBUPROFEN 600 MG: 600 TABLET, FILM COATED ORAL at 11:10

## 2017-10-13 NOTE — PLAN OF CARE
"Problem: Patient Care Overview  Goal: Plan of Care Review  Outcome: Ongoing (interventions implemented as appropriate)  Lactation note:  To room to assist with use/care of breastpump. Infant transferred to NICU this am and primary RN started use of symphony breast pump. Gave mother NICU Blue folder for lactation. Showed mother how to work pump, how to keep track of pumpings, how to label nicu breastmilk, how to clean pump parts and bring milk to NICU even if it is only a drop of milk. NICU uses mother's milk for mouth care so even small amounts are ok to bring to NICU. Mother states she pumped "two bottles" this morning. Praise provided. Mother aware to pump 8 or more times a day. Showed mother how to use Symphony pump on premie setting. Discussed history of hepatitis C and risks for transmission via blood. Encouraged mom to pump and dump any breast milk if the skin on her nipples becomes broken. Call RN with any further questions. Offered to assist with pumping and/or hand expression demonstration.  phone number on board. Mother may want to start thinking about what she will pump with when she gets home. May need to call insurance/WIC for pump options.        "

## 2017-10-13 NOTE — LACTATION NOTE
Patient states she was told she could no longer provide breast milk for her baby in NICU and has not pumped since last night. Breasts feel full but not uncomfortable according to mother. Non nursing engorgement measures discussed. Pump remains at bedside if needed.

## 2017-10-13 NOTE — DISCHARGE INSTRUCTIONS
Preparation and Hygiene:    1. Shower daily.  2. Wear a clean bra each day and wash daily in warm soapy water.  3. Change wet or moist breast pads frequently.  Moist pads can promote growth of germs.  4. Actively wash your hands, paying close attention to the area around and under your fingernails, thoroughly with soap and water for 15 seconds before pumping or handling your milk.  Re-wash your hands if you touch anything (scratching your nose, answering the phone, etc) while pumping or handling your milk.   5. Before pumping your breasts, assemble the pump collection kit and have ready the sterile container and labels.  Place these items on a clean surface next to the breastpump.  6. Each time after you have finished pumping, take apart all of the parts of the breastpump collection kit and place them in a separate cleaning container (do not place them in the sink).  Be sure to remove the yellow valve from the breastshield and separate the white membrane from the yellow valve.  Rinse all of these parts with cool water.  Then use a new sponge and/or bottle brush and dishwashing detergent to clean the parts.  Rinse off the soapy water with cool water and air dry on a clean towel covered with a clean cloth.  All parts may also be washed after each use in the top rack of a .  7. Once each day, sterilize all of the parts of the breastpump collection kit.  This can be done by boiling the kit parts for 10 minutes or by using a Quick Clean Micro-Steam Bag made by [employer]  8. If condensation appears in the tubing, continue to run the pump with the tubing attached for 1-2 minutes or until the tubing is dry.   9. Notify your babys nurse or doctor if you become ill or need to take any medication, even over-the-counter medicines.        Collection and Storage of Expressed Breastmilk:         1. Pump your breasts at least 8-10 times every 24 hours.  Double pump (both breasts at  the same time) for at least 15-20  minutes using the most suction that is comfortable.    2. Write the date and time of pumping and the name of any medications you are takingon the babys pre-printed hospital identification label.   3. Place your babys pre-printed hospital identification label on each container of breastmilk.  Additional pre-printed labels can be obtained from your babys nurse.  If your expressed breastmilk is not correctly labeled, the nurse cannot feed the milk to the baby.       4. Place a brightly colored sticker on the top of each container of milk pumped during the first 30 days.  This identifies the milk as special and having higher levels of nutrients and anti-infective properties that are so important for your baby.  Additional stickers can be obtained from the lactation consultants or your babys nurse.  5.   Do not touch the inside of the storage containers or lids.  6.      Pour the amount of expressed milk needed for 1 of your babys feedings into each   storage container. Use a new container(s) for each pumping.  Additional storage   containers can be obtained from your babys nurse.        7.       Tightly screw the lid onto the container and place immediately into the       refrigerator fordaily transportation to the hospital.   Do not freeze your milk      unless asked to do so by your babys nurse.  However, if you are not able to      visit your baby each day, place the expressed breastmilk in the freezer.  8.   Expressed breastmilk should be refrigerated or frozen within 1 hour of      pumping.  9.      Do not store expressed breastmilk on the door of your refrigerator or freezer where the temperature is warmer.         Transportation of Expressed Breastmilk:    1. Refrigerated breastmilk or frozen milk should be packed tightly together in a cooler with frozen, blue gel-packs to keep the milk frozen.  DO NOT USE ICE CUBES (WET ICE) TO TRANSPORT FROZEN MILK.   A clean towel can be used to fill any extra space  between containers of frozen milk.  2.    Bring your expressed milk from home each time you visit the baby.      [facility] lactation warmline:  [phone]    Breastfeeding Discharge Instructions     Feed the baby at the earliest sign of hunger or comfort  o Hands to mouth, sucking motions  o Rooting or searching for something to suck on  o Dont wait for crying - it is a late sign of hunger and comfort.     The feedings may be 8-12 times per 24hrs and will not follow a schedule   Avoid pacifiers and bottles for the first 4 weeks   Alternate the breast you start the feeding with, or start with the breast that feels the fullest   Switch breasts when the baby takes himself off the breast or falls asleep   Keep offering breasts until the baby looks full, no longer gives hunger signs, and stays asleep when placed on his back in the crib   If the baby is sleepy and wont wake for a feeding, put the baby skin-to-skin dressed in a diaper against the mothers bare chest   Sleep near your baby   The baby should be positioned and latched on to the breast correctly  o Chest-to-chest, chin in the breast  o Babys lips are flipped outward  o Babys mouth is stretched open wide like a shout  o Babys sucking should feel like tugging to the mother  - The baby should be drinking at the breast:  o You should hear swallowing or gulping throughout the feeding  o You should see milk on the babys lips when he comes off the breast  o Your breasts should be softer when the baby is finished feeding  o The baby should look relaxed at the end of feedings  o After the 4th day and your milk is in:  o The babys poop should turn bright yellow and be loose, watery, and seedy  o The baby should have at least 3-4 poops the size of the palm of your hand per day  o The baby should have at least 6-8 wet diapers per day  o The urine should be light yellow in color  You should drink when you are thirsty and eat a healthy diet when you are     hungry.     Take naps to get the rest you need.   Take medications and/or drink alcohol only with permission of your obstetrician    or the babys pediatrician.  You can also call the Infant Risk Center,   ([phone]), Monday-Friday, 8am-5pm Central time, to get the most   up-to-date evidence-based information on the use of medications during   pregnancy and breastfeeding.      The baby should be examined by a pediatrician at 3-5 days of age.  Once your   milk comes in, the baby should be gaining at least ½ - 1oz each day and should be back to birthweight no later than 10-14 days of age.          Community Resources    [facility] Breastfeeding Warmline: [phone]   Local [facility] clinics: provide incentives and breastpumps to eligible mothers  La Leche [employer] (LLLI):  mother-to-mother support group website        www.Auxmoneyl.NTQ-Data  Local La Leche League mother-to-mother support groups:        www.Benjamin's Desk.Et3arraf        La Leche League [facility]   Dr. Jonathan Mike website for latch videos and general information:        www.breastfeedinginc.ca  [facility] Risk Center is a call center that provides information about the safety of taking medications while breastfeeding.  Call [phone], M-F, 8am-5pm, [place].  International Lactation Consultant Association provides resources for assistance:        www.ilca.org  [facility] Breastfeeding Coalition provides informationand resources for parents  and the community    http://louisianabreastfeeding.org     Nimisha Hickman is a mom-to-mom support group:                             www.nolanLoxo Oncology.com//breastfeedng-support/  Partners for Healthy Babies:  6-921-389-BABY(2231)  Cafe [employer] Lait: a breastfeeding support group for women of color, 248-456-0795Abzgkbgat Resources    [facility] Breastfeeding Warmline: [phone]   Local [facility]: provide incentives and breastpumps to eligible mothers  La Leche [employer] International (LLLI):   mother-to-mother support group website        www.lll.org  [facility] La Leche League mother-to-mother support groups:        www.Funtactix        La Leche League [facility]   Dr. Jonathan iMke website for latch videos and general information:        www.breastfeedinginc.ca  Infant Risk Center is a call center that provides information about the safety of taking medications while breastfeeding.  Call [phone], M-F, 8am-5pm, [place].  International Lactation Consultant Association provides resources for assistance:        www.ilca.org  [facility] Breastfeeding Coalition provides informationand resources for parents  and the community    http://[facility]feeding.org     Nimisha Hickman is a mom-to-mom support group:                             www.Xylolan[employer].Postmaster//breastfeedng-support/  [facility]ners for Healthy Babies:  5-144-811-BABY(3747)  [employer]ait: a breastfeeding support group for women of color, [phone]

## 2017-10-13 NOTE — LACTATION NOTE
10/12/17 1400   Maternal Infant Feeding   Time Spent (min) 15-30 min   Breastfeeding Education adequate milk volume;importance of skin-to-skin contact;increasing milk supply;label/storage of breast milk;medication effects;milk expression, electric pump;milk expression, hand   Equipment Type/Education   Pump Type Symphony   Breast Pump Type double electric, hospital grade   Breast Pump Flange Type hard   Breast Pump Flange Size 24 mm   Lactation Referrals   Lactation Consult Follow up;Pump teaching   Lactation Interventions   Attachment Promotion counseling provided   Maternal Breastfeeding Support diary/feeding log utilized;encouragement offered;lactation counseling provided

## 2017-10-13 NOTE — ASSESSMENT & PLAN NOTE
Postpartum care:  - Patient doing well. Continue routine management and advances.  - Transition to PO pain meds. Pain well controlled.  - Encourage ambulation.   - Circumcision - Completed  - Contraception -s/p BTL  - Lactation - The patient is breast feeding. Lactation nurse following along PRN  - Rh Status - A POS

## 2017-10-13 NOTE — PROGRESS NOTES
[facility]  Obstetrics  Postpartum Progress Note    Patient Name: Rosario Barrera  MRN: [id number]  Admission Date: 10/10/2017  Hospital Length of Stay: 3 days  Attending Physician: Fariba Ash MD  Primary Care Provider: Primary Doctor No    Subjective:     Principal Problem: delivery delivered    Hospital course: 10/10/2017 - patient admitted from [facility] with ROM, thick meconium, and contractions in labor. Desired . Fetus was noted to not be tolerating labor with late decelerations so the decision was made to take the patient to the OR for a repeat Cs. RLTCS & BTL performed without complication.  10/11/2017 - POD # 1 s/p RLTCS + BTL - Patient doing well. No complaints.  Starting to meet PP goals.  10/12/2017 - POD#2  s/p RLTCS + BTL - Pt continues to do well. Meeting all postop milestones.  10/13/2017 - POD#3. Tolerating diet, ambulating, voiding, +BM, pain well controlled. Ready for discharge.    Interval History:     She is doing well this morning. She is tolerating a regular diet without nausea or vomiting. She is voiding spontaneously. She is ambulating. She has passed flatus, and has not a BM. Vaginal bleeding is mild. She denies fever or chills. Abdominal pain is mild and controlled with oral medications. She is breastfeeding. She desires circumcision for her male baby: yes (done).    Objective:     Vital Signs (Most Recent):  Temp: 98.8 °F (37.1 °C) (10/12/17 2300)  Pulse: 61 (10/12/17 2300)  Resp: 18 (10/12/17 2300)  BP: 115/63 (10/12/17 2300)  SpO2: 98 % (10/12/17 2300) Vital Signs (24h Range):  Temp:  [98.2 °F (36.8 °C)-99.4 °F (37.4 °C)] 98.8 °F (37.1 °C)  Pulse:  [60-67] 61  Resp:  [18] 18  SpO2:  [98 %-99 %] 98 %  BP: (112-116)/(62-67) 115/63     Weight: 93 kg (205 lb)  Body mass index is 29.41 kg/m².    No intake or output data in the 24 hours ending 10/13/17 0705    Significant Labs:  Lab Results   Component Value Date    [facility] MICKEY POS 10/10/2017     HEPBSAG Negative 2017    HEPBSAG Negative 2017    STREPBCULT No Group B Streptococcus isolated 2017     No results for input(s): HGB, HCT in the last 48 hours.    I have personallly reviewed all pertinent lab results from the last 24 hours.    Physical Exam:   Constitutional: She is oriented to person, place, and time. She appears well-developed and well-nourished. No distress.    HENT:   Head: Normocephalic and atraumatic.    Eyes: Conjunctivae are normal.    Neck: Neck supple.    Cardiovascular: Normal rate.     Pulmonary/Chest: Effort normal. No respiratory distress.        Abdominal: She exhibits abdominal incision (c/d/i). She exhibits no distension. There is tenderness (Appropriate for PP Period). There is no rebound and no guarding.     Genitourinary: Vagina normal.               Neurological: She is alert and oriented to person, place, and time.    Skin: Skin is warm and dry. She is not diaphoretic.    Psychiatric: She has a normal mood and affect. Her behavior is normal. Judgment and thought content normal.       Assessment/Plan:     32 y.o. female  for:    *  delivery delivered    Postpartum care:  - Patient doing well. Continue routine management and advances.  - Transition to PO pain meds. Pain well controlled.  - Encourage ambulation.   - Circumcision - Completed  - Contraception -s/p BTL  - Lactation - The patient is breast feeding. Lactation nurse following along PRN  - Rh Status - A POS        Anemia    - Pre Delivery h/h 10/32 --> Post Delivery h/h 7.5/25.3  - Fe and Colace  - VSS, asymptomatic        S/P repeat low transverse               Hepatitis C    Last VL titer >[id number]  Peds aware        History of gestational hypertension    - will monitor vitals closely  - BP: (112-116)/(62-67) 115/63 (no meds)  - preE ROS neg          Drug abuse (heroin/pills) - on subu 8mg per day- followed by Dr Lacey Douglas in [place]    - on Subutex while in hospital             Disposition: As patient meets milestones, will plan to discharge today POD 3.    Cherise Woo MD  Obstetrics  [facility]

## 2017-10-13 NOTE — SUBJECTIVE & OBJECTIVE
Hospital course: 10/10/2017 - patient admitted from [facility] with ROM, thick meconium, and contractions in labor. Desired . Fetus was noted to not be tolerating labor with late decelerations so the decision was made to take the patient to the OR for a repeat Cs. RLTCS & BTL performed without complication.  10/11/2017 - POD # 1 s/p RLTCS + BTL - Patient doing well. No complaints.  Starting to meet PP goals.  10/12/2017 - POD#2  s/p RLTCS + BTL - Pt continues to do well. Meeting all postop milestones.  10/13/2017 - POD#3. Tolerating diet, ambulating, voiding, +BM, pain well controlled. Ready for discharge.    Interval History:     She is doing well this morning. She is tolerating a regular diet without nausea or vomiting. She is voiding spontaneously. She is ambulating. She has passed flatus, and has not a BM. Vaginal bleeding is mild. She denies fever or chills. Abdominal pain is mild and controlled with oral medications. She is breastfeeding. She desires circumcision for her male baby: yes (done).    Objective:     Vital Signs (Most Recent):  Temp: 98.8 °F (37.1 °C) (10/12/17 2300)  Pulse: 61 (10/12/17 2300)  Resp: 18 (10/12/17 2300)  BP: 115/63 (10/12/17 2300)  SpO2: 98 % (10/12/17 2300) Vital Signs (24h Range):  Temp:  [98.2 °F (36.8 °C)-99.4 °F (37.4 °C)] 98.8 °F (37.1 °C)  Pulse:  [60-67] 61  Resp:  [18] 18  SpO2:  [98 %-99 %] 98 %  BP: (112-116)/(62-67) 115/63     Weight: 93 kg (205 lb)  Body mass index is 29.41 kg/m².    No intake or output data in the 24 hours ending 10/13/17 0705    Significant Labs:  Lab Results   Component Value Date    GROUPTRH A POS 10/10/2017    HEPBSAG Negative 2017    HEPBSAG Negative 2017    STREPBCULT No Group B Streptococcus isolated 2017     No results for input(s): HGB, HCT in the last 48 hours.    I have personallly reviewed all pertinent lab results from the last 24 hours.    Physical Exam:   Constitutional: She is oriented to person, place, and time. She  appears well-developed and well-nourished. No distress.    HENT:   Head: Normocephalic and atraumatic.    Eyes: Conjunctivae are normal.    Neck: Neck supple.    Cardiovascular: Normal rate.     Pulmonary/Chest: Effort normal. No respiratory distress.        Abdominal: She exhibits abdominal incision (c/d/i). She exhibits no distension. There is tenderness (Appropriate for PP Period). There is no rebound and no guarding.     Genitourinary: Vagina normal.               Neurological: She is alert and oriented to person, place, and time.    Skin: Skin is warm and dry. She is not diaphoretic.    Psychiatric: She has a normal mood and affect. Her behavior is normal. Judgment and thought content normal.

## 2017-10-13 NOTE — DISCHARGE SUMMARY
[facility]  Obstetrics  Discharge Summary      Patient Name: Rosario Barrera  MRN: [id number]  Admission Date: 10/10/2017  Hospital Length of Stay: 3 days  Discharge Date and Time:  10/13/2017 7:10 AM  Attending Physician: Fariba Ash MD   Discharging Provider: Eboni Amaya MD  Primary Care Provider: Primary Doctor No    HPI: Rosario Barrera is a 32 y.o. [id number] at 39w5d presents complaining of contractions.  In the [facility], patient noted ROM with thick meconium and then contractions increased in frequency and painfulness. Patient has a history of CS x 1 for twin gestation and would like to consider .  This IUP is complicated by maternal hepatitis C, history of maternal drug use on suboxone, and undesired fertility.    Procedure(s) (LRB):  DELIVERY- SECTION (N/A)     Hospital Course:   10/10/2017 - patient admitted from [facility] with ROM, thick meconium, and contractions in labor. Desired . Fetus was noted to not be tolerating labor with late decelerations so the decision was made to take the patient to the OR for a repeat Cs. RLTCS & BTL performed without complication.  10/11/2017 - POD # 1 s/p RLTCS + BTL - Patient doing well. No complaints.  Starting to meet PP goals.  10/12/2017 - POD#2  s/p RLTCS + BTL - Pt continues to do well. Meeting all postop milestones.  10/13/2017 - POD#3. Tolerating diet, ambulating, voiding, +BM, pain well controlled. Ready for discharge.    Consults         Status Ordering Provider     Consult to Lactation  Use PRN     Provider:  (Not yet assigned)    Acknowledged VIRY OJEDA     Inpatient consult to Social Work  Once     Provider:  (Not yet assigned)    Completed EBONI AMAYA          Final Active Diagnoses:    Diagnosis Date Noted POA    PRINCIPAL PROBLEM:   delivery delivered [O82] 2016 Yes    Anemia [D64.9] 10/11/2017 Yes    S/P repeat low transverse  [Z98.891] 10/10/2017 Not Applicable     S/P tubal ligation [Z98.51] 10/10/2017 Not Applicable    Status post repeat low transverse  section [Z98.891] 10/10/2017 Not Applicable    Hepatitis C [B19.20] 2017 Yes    History of gestational hypertension [Z87.59] 2017 Not Applicable    Drug abuse (heroin/pills) - on subu 8mg per day- followed by Dr Lacey Douglas in [place] [F19.10] 2015 Yes      Problems Resolved During this Admission:    Diagnosis Date Noted Date Resolved POA    Indication for care in labor or delivery [O75.9] 10/10/2017 10/10/2017 Yes        Labs: CBC No results for input(s): WBC, HGB, HCT, PLT in the last 48 hours.    Feeding Method: breast    Immunizations     Date Immunization Status Dose Route/Site Given by    10/10/17 0403 Influenza - Quadrivalent - PF (3 years & older) Incomplete 0.5 mL Intramuscular/Left deltoid     10/10/17 0909 MMR Incomplete 0.5 mL Subcutaneous/Left deltoid     10/10/17 0909 Tdap Incomplete 0.5 mL Intramuscular/Left deltoid           Delivery:    Episiotomy: None   Lacerations: None   Repair suture:     Repair # of packets: 8   Blood loss (ml): 0     Birth information:  YOB: 2016   Time of birth: 6:34 AM   Sex: male   Delivery type: , Low Transverse   Gestational Age: 39w5d    Delivery Clinician:      Other providers:       Additional  information:  Forceps:    Vacuum:    Breech:    Observed anomalies      Living?:           APGARS  One minute Five minutes Ten minutes   Skin color:         Heart rate:         Grimace:         Muscle tone:         Breathing:         Totals: 8  9        Placenta: Delivered:       appearance    Pending Diagnostic Studies:     None          Discharged Condition: good    Disposition: Home or Self Care    Follow Up:  Follow-up Information     Fariba Ash MD. Schedule an appointment as soon as possible for a visit in 6 weeks.    Specialty:  Obstetrics and Gynecology  Why:  Postpartum  Contact information:  [address]  [facility] [address]  [phone]                 Patient Instructions:     Diet general     Activity as tolerated     Lifting restrictions     Other restrictions (specify):   Order Comments: Pelvic rest until post partum visit. (No sex, no tampons, etc.)     Call MD for:  temperature >100.4     Call MD for:  persistent nausea and vomiting or diarrhea     Call MD for:  severe uncontrolled pain     Call MD for:  difficulty breathing or increased cough     Call MD for:  severe persistent headache     Call MD for:  persistent dizziness, light-headedness, or visual disturbances     Call MD for:  increased confusion or weakness     Call MD for:   Order Comments: Vaginal bleeding through one or more pads each hour for 2 hours     Call MD for:  redness, tenderness, or signs of infection (pain, swelling, redness, odor or green/yellow discharge around incision site)       Medications:  Current Discharge Medication List      START taking these medications    Details   hydrocodone-acetaminophen 5-325mg (NORCO) 5-325 mg per tablet Take 1 tablet by mouth every 4 (four) hours as needed.  Qty: 20 tablet, Refills: 0    Associated Diagnoses: S/P repeat low transverse       ibuprofen (ADVIL,MOTRIN) 600 MG tablet Take 1 tablet (600 mg total) by mouth every 6 (six) hours.  Qty: 30 tablet, Refills: 1    Associated Diagnoses: S/P repeat low transverse          CONTINUE these medications which have NOT CHANGED    Details   ferrous sulfate 325 mg (65 mg iron) Tab tablet Take 1 tablet (325 mg total) by mouth once daily.  Qty: 30 tablet, Refills: 3    Associated Diagnoses: Anemia, due to inadequate iron intake         STOP taking these medications       acetaminophen (TYLENOL) 500 MG tablet Comments:   Reason for Stopping:         SUBOXONE 8-2 mg Film Comments:   Reason for Stopping:               Eboni Amaya MD  Obstetrics  [facility]

## 2017-10-14 VITALS
WEIGHT: 205 LBS | OXYGEN SATURATION: 99 % | SYSTOLIC BLOOD PRESSURE: 124 MMHG | TEMPERATURE: 99 F | HEART RATE: 65 BPM | HEIGHT: 70 IN | DIASTOLIC BLOOD PRESSURE: 66 MMHG | BODY MASS INDEX: 29.35 KG/M2 | RESPIRATION RATE: 18 BRPM

## 2017-10-14 PROCEDURE — 25000003 PHARM REV CODE 250: Performed by: STUDENT IN AN ORGANIZED HEALTH CARE EDUCATION/TRAINING PROGRAM

## 2017-10-14 PROCEDURE — 3E0234Z INTRODUCTION OF SERUM, TOXOID AND VACCINE INTO MUSCLE, PERCUTANEOUS APPROACH: ICD-10-PCS | Performed by: OBSTETRICS & GYNECOLOGY

## 2017-10-14 PROCEDURE — 99238 HOSP IP/OBS DSCHRG MGMT 30/<: CPT | Mod: ,,, | Performed by: OBSTETRICS & GYNECOLOGY

## 2017-10-14 PROCEDURE — 99024 POSTOP FOLLOW-UP VISIT: CPT | Mod: ,,, | Performed by: OBSTETRICS & GYNECOLOGY

## 2017-10-14 PROCEDURE — 90686 IIV4 VACC NO PRSV 0.5 ML IM: CPT | Performed by: STUDENT IN AN ORGANIZED HEALTH CARE EDUCATION/TRAINING PROGRAM

## 2017-10-14 PROCEDURE — 90715 TDAP VACCINE 7 YRS/> IM: CPT | Performed by: STUDENT IN AN ORGANIZED HEALTH CARE EDUCATION/TRAINING PROGRAM

## 2017-10-14 PROCEDURE — 63600175 PHARM REV CODE 636 W HCPCS: Performed by: STUDENT IN AN ORGANIZED HEALTH CARE EDUCATION/TRAINING PROGRAM

## 2017-10-14 PROCEDURE — 90472 IMMUNIZATION ADMIN EACH ADD: CPT | Performed by: STUDENT IN AN ORGANIZED HEALTH CARE EDUCATION/TRAINING PROGRAM

## 2017-10-14 PROCEDURE — 90471 IMMUNIZATION ADMIN: CPT | Performed by: STUDENT IN AN ORGANIZED HEALTH CARE EDUCATION/TRAINING PROGRAM

## 2017-10-14 RX ADMIN — HYDROCODONE BITARTRATE AND ACETAMINOPHEN 1 TABLET: 10; 325 TABLET ORAL at 08:10

## 2017-10-14 RX ADMIN — BUPRENORPHINE HYDROCHLORIDE 2 MG: 2 TABLET SUBLINGUAL at 06:10

## 2017-10-14 RX ADMIN — IBUPROFEN 600 MG: 600 TABLET, FILM COATED ORAL at 06:10

## 2017-10-14 RX ADMIN — INFLUENZA A VIRUS A/MICHIGAN/45/2015 X-275 (H1N1) ANTIGEN (FORMALDEHYDE INACTIVATED), INFLUENZA A VIRUS A/HONG KONG/4801/2014 X-263B (H3N2) ANTIGEN (FORMALDEHYDE INACTIVATED), INFLUENZA B VIRUS B/PHUKET/3073/2013 ANTIGEN (FORMALDEHYDE INACTIVATED), AND INFLUENZA B VIRUS B/BRISBANE/60/2008 ANTIGEN (FORMALDEHYDE INACTIVATED) 0.5 ML: 15; 15; 15; 15 INJECTION, SUSPENSION INTRAMUSCULAR at 09:10

## 2017-10-14 RX ADMIN — CLOSTRIDIUM TETANI TOXOID ANTIGEN (FORMALDEHYDE INACTIVATED), CORYNEBACTERIUM DIPHTHERIAE TOXOID ANTIGEN (FORMALDEHYDE INACTIVATED), BORDETELLA PERTUSSIS TOXOID ANTIGEN (GLUTARALDEHYDE INACTIVATED), BORDETELLA PERTUSSIS FILAMENTOUS HEMAGGLUTININ ANTIGEN (FORMALDEHYDE INACTIVATED), BORDETELLA PERTUSSIS PERTACTIN ANTIGEN, AND BORDETELLA PERTUSSIS FIMBRIAE 2/3 ANTIGEN 0.5 ML: 5; 2; 2.5; 5; 3; 5 INJECTION, SUSPENSION INTRAMUSCULAR at 09:10

## 2017-10-14 NOTE — DISCHARGE SUMMARY
[facility]  Obstetrics  Discharge Summary      Patient Name: Rosario Barrera  MRN: [id number]  Admission Date: 10/10/2017  Hospital Length of Stay: 4 days  Discharge Date and Time:  10/14/2017 7:10 AM  Attending Physician: Fariba Ash MD   Discharging Provider: Cherise Woo MD  Primary Care Provider: Primary Doctor No    HPI: Rosario Barrera is a 32 y.o. [id number] at 39w5d presents complaining of contractions.  In the [facility], patient noted ROM with thick meconium and then contractions increased in frequency and painfulness. Patient has a history of CS x 1 for twin gestation and would like to consider .  This IUP is complicated by maternal hepatitis C, history of maternal drug use on suboxone, and undesired fertility.    Procedure(s) (LRB):  DELIVERY- SECTION (N/A)     Hospital Course:   10/10/2017 - patient admitted from [facility] with ROM, thick meconium, and contractions in labor. Desired . Fetus was noted to not be tolerating labor with late decelerations so the decision was made to take the patient to the OR for a repeat Cs. RLTCS & BTL performed without complication.  10/11/2017 - POD # 1 s/p RLTCS + BTL - Patient doing well. No complaints.  Starting to meet PP goals.  10/12/2017 - POD#2  s/p RLTCS + BTL - Pt continues to do well. Meeting all postop milestones.  10/13/2017 - POD#3. Tolerating diet, ambulating, voiding, +BM, pain well controlled.   10/14/2017 - POD#4. Pt doing well. Continues to meet all post op milestones and desires discharge home today.    Consults         Status Ordering Provider     Consult to Lactation  Use PRN     Provider:  (Not yet assigned)    VIRY Schmid     Inpatient consult to Social Work  Once     Provider:  (Not yet assigned)    Completed ZACK TAY          Final Active Diagnoses:    Diagnosis Date Noted POA    PRINCIPAL PROBLEM:   delivery delivered [O82] 2016 Yes    Anemia  [D64.9] 10/11/2017 Yes    S/P repeat low transverse  [Z98.891] 10/10/2017 Not Applicable    S/P tubal ligation [Z98.51] 10/10/2017 Not Applicable    Status post repeat low transverse  section [Z98.891] 10/10/2017 Not Applicable    Hepatitis C [B19.20] 2017 Yes    History of gestational hypertension [Z87.59] 2017 Not Applicable    Drug abuse (heroin/pills) - on subu 8mg per day- followed by Dr Lacey Douglas in [place] [F19.10] 2015 Yes      Problems Resolved During this Admission:    Diagnosis Date Noted Date Resolved POA    Indication for care in labor or delivery [O75.9] 10/10/2017 10/10/2017 Yes        Labs: CBC No results for input(s): WBC, HGB, HCT, PLT in the last 48 hours.    Feeding Method: breast    Immunizations     Date Immunization Status Dose Route/Site Given by    10/10/17 0403 Influenza - Quadrivalent - PF (3 years & older) Incomplete 0.5 mL Intramuscular/Left deltoid     10/10/17 09 MMR Incomplete 0.5 mL Subcutaneous/Left deltoid     10/10/17 0909 Tdap Incomplete 0.5 mL Intramuscular/Left deltoid           Delivery:    Episiotomy: None   Lacerations: None   Repair suture:     Repair # of packets: 8   Blood loss (ml): 0     Birth information:  YOB: 2016   Time of birth: 6:34 AM   Sex: male   Delivery type: , Low Transverse   Gestational Age: 39w5d    Delivery Clinician:      Other providers:       Additional  information:  Forceps:    Vacuum:    Breech:    Observed anomalies      Living?:           APGARS  One minute Five minutes Ten minutes   Skin color:         Heart rate:         Grimace:         Muscle tone:         Breathing:         Totals: 8  9        Placenta: Delivered:       appearance    Pending Diagnostic Studies:     None          Discharged Condition: good    Disposition: Home or Self Care    Follow Up:  Follow-up Information     Fariba Ash MD. Schedule an appointment as soon as possible for a visit in 6  weeks.    Specialty:  Obstetrics and Gynecology  Why:  Postpartum  Contact information:  [address] [facility] [address]  [phone]                 Patient Instructions:     Diet general     Activity as tolerated     Lifting restrictions     Other restrictions (specify):   Order Comments: Pelvic rest until post partum visit. (No sex, no tampons, etc.)     Call MD for:  temperature >100.4     Call MD for:  persistent nausea and vomiting or diarrhea     Call MD for:  severe uncontrolled pain     Call MD for:  difficulty breathing or increased cough     Call MD for:  severe persistent headache     Call MD for:  persistent dizziness, light-headedness, or visual disturbances     Call MD for:  increased confusion or weakness     Call MD for:   Order Comments: Vaginal bleeding through one or more pads each hour for 2 hours     Call MD for:  redness, tenderness, or signs of infection (pain, swelling, redness, odor or green/yellow discharge around incision site)       Medications:  Current Discharge Medication List      START taking these medications    Details   hydrocodone-acetaminophen 5-325mg (NORCO) 5-325 mg per tablet Take 1 tablet by mouth every 4 (four) hours as needed.  Qty: 20 tablet, Refills: 0    Associated Diagnoses: S/P repeat low transverse       ibuprofen (ADVIL,MOTRIN) 600 MG tablet Take 1 tablet (600 mg total) by mouth every 6 (six) hours.  Qty: 30 tablet, Refills: 1    Associated Diagnoses: S/P repeat low transverse          CONTINUE these medications which have NOT CHANGED    Details   ferrous sulfate 325 mg (65 mg iron) Tab tablet Take 1 tablet (325 mg total) by mouth once daily.  Qty: 30 tablet, Refills: 3    Associated Diagnoses: Anemia, due to inadequate iron intake         STOP taking these medications       acetaminophen (TYLENOL) 500 MG tablet Comments:   Reason for Stopping:         SUBOXONE 8-2 mg Film Comments:   Reason for Stopping:               Cherise Woo,  MD  Obstetrics  [facility]

## 2017-10-14 NOTE — PROGRESS NOTES
[facility]  Obstetrics  Postpartum Progress Note    Patient Name: Rosario Barrera  MRN: [id number]  Admission Date: 10/10/2017  Hospital Length of Stay: 4 days  Attending Physician: Fariba Ash MD  Primary Care Provider: Primary Doctor No    Subjective:     Principal Problem: delivery delivered    Hospital course: 10/10/2017 - patient admitted from [facility] with ROM, thick meconium, and contractions in labor. Desired . Fetus was noted to not be tolerating labor with late decelerations so the decision was made to take the patient to the OR for a repeat Cs. RLTCS & BTL performed without complication.  10/11/2017 - POD # 1 s/p RLTCS + BTL - Patient doing well. No complaints.  Starting to meet PP goals.  10/12/2017 - POD#2  s/p RLTCS + BTL - Pt continues to do well. Meeting all postop milestones.  10/13/2017 - POD#3. Tolerating diet, ambulating, voiding, +BM, pain well controlled.   10/14/2017 - POD#4. Pt doing well. Continues to meet all post op milestones and desires discharge home today.    Interval History:     She is doing well this morning. She is tolerating a regular diet without nausea or vomiting. She is voiding spontaneously. She is ambulating. She has passed flatus, and has a BM. Vaginal bleeding is mild. She denies fever or chills. Abdominal pain is mild and controlled with oral medications. She is breastfeeding. She desires circumcision for her male baby: yes (done). Male infant now in NICU.    Objective:     Vital Signs (Most Recent):  Temp: 97.9 °F (36.6 °C) (10/14/17 0028)  Pulse: (!) 59 (10/14/17 0028)  Resp: 16 (10/14/17 0028)  BP: 127/65 (10/14/17 0028)  SpO2: 97 % (10/14/17 0028) Vital Signs (24h Range):  Temp:  [97.9 °F (36.6 °C)-99 °F (37.2 °C)] 97.9 °F (36.6 °C)  Pulse:  [59-67] 59  Resp:  [16-18] 16  SpO2:  [97 %] 97 %  BP: (120-127)/(65-87) 127/65     Weight: 93 kg (205 lb)  Body mass index is 29.41 kg/m².    No intake or output data in the 24 hours  ending 10/14/17 0558    Significant Labs:  Lab Results   Component Value Date    GROUPTRH A POS 10/10/2017    HEPBSAG Negative 2017    HEPBSAG Negative 2017    STREPBCULT No Group B Streptococcus isolated 2017     No results for input(s): HGB, HCT in the last 48 hours.    I have personallly reviewed all pertinent lab results from the last 24 hours.    Physical Exam:   Constitutional: She is oriented to person, place, and time. She appears well-developed and well-nourished. No distress.    HENT:   Head: Normocephalic and atraumatic.    Eyes: Conjunctivae are normal.    Neck: Neck supple.    Cardiovascular: Normal rate.     Pulmonary/Chest: Effort normal. No respiratory distress.        Abdominal: She exhibits abdominal incision (c/d/i). She exhibits no distension. There is tenderness (Appropriate for PP Period). There is no rebound and no guarding.     Genitourinary: Vagina normal.               Neurological: She is alert and oriented to person, place, and time.    Skin: Skin is warm and dry. She is not diaphoretic.    Psychiatric: She has a normal mood and affect. Her behavior is normal. Judgment and thought content normal.       Assessment/Plan:     32 y.o. female  for:    *  delivery delivered    Postpartum care:  - Patient doing well. Continue routine management and advances.  - Transition to PO pain meds. Pain well controlled.  - Encourage ambulation.   - Circumcision - Completed  - Contraception -s/p BTL  - Lactation - The patient is breast feeding. Lactation nurse following along PRN  - Rh Status - A POS        Anemia    - Pre Delivery h/h 10/32 --> Post Delivery h/h 7.5/25.3  - Fe and Colace  - VSS, asymptomatic        S/P repeat low transverse               Hepatitis C    Last VL titer >467100  Peds aware        History of gestational hypertension    - normotensive          Drug abuse (heroin/pills) - on subu 8mg per day- followed by Dr Lacey Douglas in [place]     - on Subutex while in hospital, transition back to home suboxone at discharge  - has rx at home            Disposition: As patient meets milestones, will plan to discharge today.    Cherise Woo MD  Obstetrics  [facility]

## 2017-10-14 NOTE — PLAN OF CARE
Problem: Patient Care Overview  Goal: Plan of Care Review  Outcome: Outcome(s) achieved Date Met: 10/14/17  Doing well ambulating and voiding without difficulty. Passing flatus. Light rubra.vital signs stable. Pain controlled with oral pain meds. States going to formula fed baby. Baby remains in NICU. Going home..

## 2017-10-14 NOTE — SUBJECTIVE & OBJECTIVE
Hospital course: 10/10/2017 - patient admitted from [facility] with ROM, thick meconium, and contractions in labor. Desired . Fetus was noted to not be tolerating labor with late decelerations so the decision was made to take the patient to the OR for a repeat Cs. RLTCS & BTL performed without complication.  10/11/2017 - POD # 1 s/p RLTCS + BTL - Patient doing well. No complaints.  Starting to meet PP goals.  10/12/2017 - POD#2  s/p RLTCS + BTL - Pt continues to do well. Meeting all postop milestones.  10/13/2017 - POD#3. Tolerating diet, ambulating, voiding, +BM, pain well controlled.   10/14/2017 - POD#4. Pt doing well. Continues to meet all post op milestones and desires discharge home today.    Interval History:     She is doing well this morning. She is tolerating a regular diet without nausea or vomiting. She is voiding spontaneously. She is ambulating. She has passed flatus, and has a BM. Vaginal bleeding is mild. She denies fever or chills. Abdominal pain is mild and controlled with oral medications. She is breastfeeding. She desires circumcision for her male baby: yes (done). Male infant now in NICU.    Objective:     Vital Signs (Most Recent):  Temp: 97.9 °F (36.6 °C) (10/14/17 0028)  Pulse: (!) 59 (10/14/17 0028)  Resp: 16 (10/14/17 0028)  BP: 127/65 (10/14/17 0028)  SpO2: 97 % (10/14/17 0028) Vital Signs (24h Range):  Temp:  [97.9 °F (36.6 °C)-99 °F (37.2 °C)] 97.9 °F (36.6 °C)  Pulse:  [59-67] 59  Resp:  [16-18] 16  SpO2:  [97 %] 97 %  BP: (120-127)/(65-87) 127/65     Weight: 93 kg (205 lb)  Body mass index is 29.41 kg/m².    No intake or output data in the 24 hours ending 10/14/17 0558    Significant Labs:  Lab Results   Component Value Date    GROUPTRH A POS 10/10/2017    HEPBSAG Negative 2017    HEPBSAG Negative 2017    STREPBCULT No Group B Streptococcus isolated 2017     No results for input(s): HGB, HCT in the last 48 hours.    I have personallly reviewed all pertinent lab results  from the last 24 hours.    Physical Exam:   Constitutional: She is oriented to person, place, and time. She appears well-developed and well-nourished. No distress.    HENT:   Head: Normocephalic and atraumatic.    Eyes: Conjunctivae are normal.    Neck: Neck supple.    Cardiovascular: Normal rate.     Pulmonary/Chest: Effort normal. No respiratory distress.        Abdominal: She exhibits abdominal incision (c/d/i). She exhibits no distension. There is tenderness (Appropriate for PP Period). There is no rebound and no guarding.     Genitourinary: Vagina normal.               Neurological: She is alert and oriented to person, place, and time.    Skin: Skin is warm and dry. She is not diaphoretic.    Psychiatric: She has a normal mood and affect. Her behavior is normal. Judgment and thought content normal.

## 2017-10-15 ENCOUNTER — HOSPITAL ENCOUNTER (EMERGENCY)
Facility: OTHER | Age: 32
Discharge: HOME | End: 2017-10-15
Attending: OBSTETRICS & GYNECOLOGY
Payer: MEDICAID

## 2017-10-15 VITALS
OXYGEN SATURATION: 100 % | TEMPERATURE: 97 F | HEART RATE: 73 BPM | DIASTOLIC BLOOD PRESSURE: 70 MMHG | SYSTOLIC BLOOD PRESSURE: 131 MMHG | RESPIRATION RATE: 17 BRPM

## 2017-10-15 DIAGNOSIS — G89.18 POST-OPERATIVE PAIN: Primary | ICD-10-CM

## 2017-10-15 PROCEDURE — 99283 EMERGENCY DEPT VISIT LOW MDM: CPT

## 2017-10-15 PROCEDURE — 99284 EMERGENCY DEPT VISIT MOD MDM: CPT | Mod: ,,, | Performed by: OBSTETRICS & GYNECOLOGY

## 2017-10-15 PROCEDURE — 25000003 PHARM REV CODE 250: Performed by: STUDENT IN AN ORGANIZED HEALTH CARE EDUCATION/TRAINING PROGRAM

## 2017-10-15 RX ORDER — SIMETHICONE 80 MG
1 TABLET,CHEWABLE ORAL 3 TIMES DAILY PRN
Status: DISCONTINUED | OUTPATIENT
Start: 2017-10-15 | End: 2017-10-15 | Stop reason: HOSPADM

## 2017-10-15 RX ORDER — HYDROCODONE BITARTRATE AND ACETAMINOPHEN 5; 325 MG/1; MG/1
1 TABLET ORAL ONCE
Status: COMPLETED | OUTPATIENT
Start: 2017-10-15 | End: 2017-10-15

## 2017-10-15 RX ADMIN — SIMETHICONE CHEW TAB 80 MG 80 MG: 80 TABLET ORAL at 07:10

## 2017-10-15 RX ADMIN — HYDROCODONE BITARTRATE AND ACETAMINOPHEN 1 TABLET: 5; 325 TABLET ORAL at 07:10

## 2017-10-15 NOTE — ED NOTES
Pt arrived to OB ED c/o cramping. Pt states she had a c.section Tuesday morning. Pt vital stable. Dr. Phillips notified.

## 2017-10-15 NOTE — ED PROVIDER NOTES
Encounter Date: 10/15/2017       History     Chief Complaint   Patient presents with    Abdominal Pain     Rosario Barrera is a 32 y.o. [id number] at 39w5d presenting with abdominal pain. Reports sudden onset of gas like pain while visiting her infant in the nicu. Patient has not been taking her pain medication as scheduled. Denies fevers, chills. Having normal bowel movements and passing gas. Endorses some nausea.           Review of patient's allergies indicates:   Allergen Reactions    Ceclor [cefaclor] Rash    Sulfa (sulfonamide antibiotics) Rash     Past Medical History:   Diagnosis Date    Eczema      Past Surgical History:   Procedure Laterality Date    APPENDECTOMY      DILATION AND CURETTAGE OF UTERUS       Family History   Problem Relation Age of Onset    Breast cancer Neg Hx     Colon cancer Neg Hx     Ovarian cancer Neg Hx      Social History   Substance Use Topics    Smoking status: Never Smoker    Smokeless tobacco: Never Used    Alcohol use No     Review of Systems   Constitutional: Negative for chills and fever.   HENT: Negative for congestion.    Eyes: Negative for visual disturbance.   Respiratory: Negative for shortness of breath.    Cardiovascular: Negative for chest pain.   Gastrointestinal: Positive for abdominal pain. Negative for constipation, diarrhea, nausea and vomiting.   Genitourinary: Negative for dysuria, hematuria, vaginal bleeding and vaginal discharge.   Musculoskeletal: Negative for back pain.   Neurological: Negative for dizziness and headaches.       Physical Exam   Temp:  [97.3 °F (36.3 °C)-99 °F (37.2 °C)] 97.3 °F (36.3 °C)  Pulse:  [64-83] 73  Resp:  [17-18] 17  SpO2:  [92 %-100 %] 100 %  BP: (124-135)/(66-78) 131/70    Physical Exam    Constitutional: She appears well-developed and well-nourished. No distress.   HENT:   Head: Normocephalic and atraumatic.   Cardiovascular: Normal rate and regular rhythm.   Pulmonary/Chest: No respiratory distress.    Abdominal: Soft. She exhibits no distension. There is no tenderness. There is no rebound and no guarding.   Musculoskeletal: She exhibits no edema.   Neurological: She is alert and oriented to person, place, and time.   Psychiatric: She has a normal mood and affect.         ED Course   Procedures  Labs Reviewed - No data to display          Medical Decision Making:   ED Management:  --given norco and simethicone (has not been taking her rx'd meds)  --pain improved, patient discharged with instructions to keep up with pain regimen              Attending Attestation:   Physician Attestation Statement for Resident:  As the supervising MD   Physician Attestation Statement: I have personally seen and examined this patient.   I agree with the above history. -:   As the supervising MD I agree with the above PE.    As the supervising MD I agree with the above treatment, course, plan, and disposition.   -: Patient evaluated and found to be stable, agree with resident's assessment of routine postop pain exacerbated by stopping pain medications and without s/s postop wound infection and plan to discharge home.  I was personally present during the critical portions of the procedure(s) performed by the resident and was immediately available in the ED to provide services and assistance as needed during the entire procedure.  I have reviewed and agree with the residents interpretation of the following: lab data.  I have reviewed the following: old records at this facility.                    ED Course      Clinical Impression:   The encounter diagnosis was Post-operative pain.                             Cherise Briggs MD  Resident  10/15/17 3730       Montse Blue MD  10/17/17 [id number]

## 2022-07-25 ENCOUNTER — OFFICE VISIT (OUTPATIENT)
Dept: URGENT CARE | Facility: CLINIC | Age: 37
End: 2022-07-25
Payer: COMMERCIAL

## 2022-07-25 VITALS
RESPIRATION RATE: 16 BRPM | WEIGHT: 205 LBS | OXYGEN SATURATION: 98 % | DIASTOLIC BLOOD PRESSURE: 86 MMHG | BODY MASS INDEX: 29.41 KG/M2 | SYSTOLIC BLOOD PRESSURE: 150 MMHG | TEMPERATURE: 98 F | HEART RATE: 77 BPM

## 2022-07-25 DIAGNOSIS — J00 ACUTE NASOPHARYNGITIS: ICD-10-CM

## 2022-07-25 DIAGNOSIS — H66.91 ACUTE OTITIS MEDIA, RIGHT: ICD-10-CM

## 2022-07-25 DIAGNOSIS — H60.501 ACUTE OTITIS EXTERNA OF RIGHT EAR, UNSPECIFIED TYPE: Primary | ICD-10-CM

## 2022-07-25 PROCEDURE — 96372 PR INJECTION,THERAP/PROPH/DIAG2ST, IM OR SUBCUT: ICD-10-PCS | Mod: S$GLB,,, | Performed by: STUDENT IN AN ORGANIZED HEALTH CARE EDUCATION/TRAINING PROGRAM

## 2022-07-25 PROCEDURE — 96372 THER/PROPH/DIAG INJ SC/IM: CPT | Mod: S$GLB,,, | Performed by: STUDENT IN AN ORGANIZED HEALTH CARE EDUCATION/TRAINING PROGRAM

## 2022-07-25 PROCEDURE — 99203 OFFICE O/P NEW LOW 30 MIN: CPT | Mod: 25,S$GLB,, | Performed by: STUDENT IN AN ORGANIZED HEALTH CARE EDUCATION/TRAINING PROGRAM

## 2022-07-25 PROCEDURE — 99203 PR OFFICE/OUTPT VISIT, NEW, LEVL III, 30-44 MIN: ICD-10-PCS | Mod: 25,S$GLB,, | Performed by: STUDENT IN AN ORGANIZED HEALTH CARE EDUCATION/TRAINING PROGRAM

## 2022-07-25 RX ORDER — DEXAMETHASONE SODIUM PHOSPHATE 4 MG/ML
8 INJECTION, SOLUTION INTRA-ARTICULAR; INTRALESIONAL; INTRAMUSCULAR; INTRAVENOUS; SOFT TISSUE
Status: COMPLETED | OUTPATIENT
Start: 2022-07-25 | End: 2022-07-25

## 2022-07-25 RX ORDER — PHENIRAMINE MALEATE, PHENYLEPHRINE HCL 17.5; 1 MG/1; MG/1
1 TABLET ORAL
Qty: 28 TABLET | Refills: 0 | Status: SHIPPED | OUTPATIENT
Start: 2022-07-25 | End: 2022-11-27

## 2022-07-25 RX ORDER — CIPROFLOXACIN AND DEXAMETHASONE 3; 1 MG/ML; MG/ML
4 SUSPENSION/ DROPS AURICULAR (OTIC) 2 TIMES DAILY
Qty: 7.5 ML | Refills: 0 | Status: SHIPPED | OUTPATIENT
Start: 2022-07-25 | End: 2022-08-01

## 2022-07-25 RX ORDER — AMOXICILLIN AND CLAVULANATE POTASSIUM 875; 125 MG/1; MG/1
1 TABLET, FILM COATED ORAL EVERY 12 HOURS
Qty: 20 TABLET | Refills: 0 | Status: SHIPPED | OUTPATIENT
Start: 2022-07-25 | End: 2022-08-04

## 2022-07-25 RX ADMIN — DEXAMETHASONE SODIUM PHOSPHATE 8 MG: 4 INJECTION, SOLUTION INTRA-ARTICULAR; INTRALESIONAL; INTRAMUSCULAR; INTRAVENOUS; SOFT TISSUE at 09:07

## 2022-07-25 NOTE — PROGRESS NOTES
Subjective:       Patient ID: Rosario Mejia is a 36 y.o. female.    Vitals:  weight is 93 kg (205 lb). Her temperature is 97.8 °F (36.6 °C). Her blood pressure is 150/86 (abnormal) and her pulse is 77. Her respiration is 16 and oxygen saturation is 98%.     Chief Complaint: Otalgia    Patient is a 36-year-old female who presents to clinic for evaluation of ear pain.  Patient with past medical history of eczema, anemia, and hepatitis-C.  Patient reports she is vaccinated.  Patient denies any recent or known sick exposures.  Patient states ear pain began last night.  Patient complaining of right-sided ear pain.  Patient describes pain to be constant.  Patient describes pain to be a dullness or pressure type sensation.  Patient rates pain at current a 7 on 10 scale and at worst a 10 on 10 scale.  Patient states that she has experience muffled hearing from the right ear and drainage from her right ear this morning.  Patient denies any trauma or injury to the ear which may have led to symptoms.  Patient reports she has been swimming lately.  Patient reports no over-the-counter medications for her ear complications.  Patient reports no history of ear infections.  Patient reports symptoms are isolated to right ear and left ear is without problem.  Patient states that for the past 3-4 weeks she has dealt with common cold and allergy type symptoms.  Patient states has been taking over-the-counter medications with some relief of symptoms.  Patient requesting Decadron injection to help improve symptoms as she is leaving on vacation in 5 days.  Patient states the symptoms include slight sinus headaches, nasal sinus congestion with postnasal drainage, and an occasional nonproductive cough due to postnasal drainage.  Patient denies any acute dizziness, generalized body aches, fever or chills, fatigue, tinnitus, sore throat, chest pain or shortness of breath, abdominal pain, nausea or vomiting, diarrhea, urinary symptoms,  rash, or change in mentation.    Otalgia   There is pain in the right ear. This is a new problem. The current episode started yesterday. The problem occurs constantly. There has been no fever. The pain is moderate. Associated symptoms include coughing (Occasional), ear discharge (Right ear), headaches and hearing loss (Right ear). Pertinent negatives include no abdominal pain, diarrhea, rash, sore throat or vomiting. She has tried nothing for the symptoms.       Constitution: Negative for chills, sweating, fatigue and fever.   HENT: Positive for ear pain (Right ear), ear discharge (Right ear), hearing loss (Right ear), congestion and postnasal drip. Negative for foreign body in ear, tinnitus and sore throat.    Neck: neck negative. Negative for painful lymph nodes.   Cardiovascular: Negative.  Negative for chest pain and palpitations.   Eyes: Negative.    Respiratory: Positive for cough (Occasional). Negative for chest tightness, sputum production and shortness of breath.    Gastrointestinal: Negative.  Negative for abdominal pain, nausea, vomiting and diarrhea.   Endocrine: negative.   Genitourinary: Negative.  Negative for dysuria, frequency and urgency.   Musculoskeletal: Negative.  Negative for muscle ache.   Skin: Negative.  Negative for color change, pale, rash and erythema.   Allergic/Immunologic: Positive for seasonal allergies.   Neurological: Positive for headaches. Negative for dizziness, light-headedness, passing out, disorientation and altered mental status.   Hematologic/Lymphatic: Negative.  Negative for swollen lymph nodes.   Psychiatric/Behavioral: Negative.  Negative for altered mental status, disorientation and confusion.       Objective:      Physical Exam   Constitutional: She is oriented to person, place, and time. She appears well-developed. She is cooperative.  Non-toxic appearance. She does not appear ill. No distress.   HENT:   Head: Normocephalic and atraumatic.   Ears:   Right Ear: There  is drainage and tenderness (Pain with movement). Tympanic membrane is erythematous (Mildly erythemic right ear canal) and retracted. Decreased hearing is noted.   Left Ear: Hearing, tympanic membrane, external ear and ear canal normal.   Nose: Congestion present. No mucosal edema, rhinorrhea or nasal deformity. No epistaxis. Right sinus exhibits no maxillary sinus tenderness and no frontal sinus tenderness. Left sinus exhibits no maxillary sinus tenderness and no frontal sinus tenderness.   Mouth/Throat: Uvula is midline, oropharynx is clear and moist and mucous membranes are normal. Mucous membranes are moist. No trismus in the jaw. Normal dentition. No uvula swelling. No oropharyngeal exudate or posterior oropharyngeal erythema. Oropharynx is clear.   Eyes: Conjunctivae and lids are normal. Pupils are equal, round, and reactive to light. Right eye exhibits no discharge. Left eye exhibits no discharge. No scleral icterus.   Neck: Trachea normal and phonation normal. Neck supple. No neck rigidity present.   Cardiovascular: Normal rate, regular rhythm, normal heart sounds and normal pulses.   Pulmonary/Chest: Effort normal and breath sounds normal. No respiratory distress. She has no wheezes. She has no rhonchi. She has no rales.   Abdominal: Normal appearance and bowel sounds are normal. She exhibits no distension. Soft. There is no abdominal tenderness.   Musculoskeletal: Normal range of motion.         General: No deformity. Normal range of motion.      Cervical back: She exhibits no tenderness.   Lymphadenopathy:     She has no cervical adenopathy.   Neurological: She is alert and oriented to person, place, and time. She exhibits normal muscle tone. Coordination normal.   Skin: Skin is warm, dry, intact, not diaphoretic, not pale and no rash. Capillary refill takes less than 2 seconds. No erythema   Psychiatric: Her speech is normal and behavior is normal. Judgment and thought content normal.   Nursing note and  vitals reviewed.        Assessment:       1. Acute otitis externa of right ear, unspecified type    2. Acute otitis media, right    3. Acute nasopharyngitis          Plan:         Acute otitis externa of right ear, unspecified type    Acute otitis media, right    Acute nasopharyngitis    Other orders  -     amoxicillin-clavulanate 875-125mg (AUGMENTIN) 875-125 mg per tablet; Take 1 tablet by mouth every 12 (twelve) hours. for 10 days  Dispense: 20 tablet; Refill: 0  -     ciprofloxacin-dexamethasone 0.3-0.1% (CIPRODEX) 0.3-0.1 % DrpS; Place 4 drops into the right ear 2 (two) times daily. for 7 days  Dispense: 7.5 mL; Refill: 0  -     dexamethasone injection 8 mg  -     phenylephrine-pheniramine (ALAHIST D) 10-17.5 mg Tab; Take 1 tablet by mouth every 4 to 6 hours as needed (Nasal sinus congestion).  Dispense: 28 tablet; Refill: 0                 Decadron 8 mg IM in clinic per patient request.  Patient tolerated well.  No complications noted.  Take medications as prescribed.  Tylenol/Motrin per package instructions for any pain or fever.  Wet precautions.  Avoid getting right inner ear wet for minimal 10 days and until completion of antibiotics.  Assure adequate hydration.  Follow-up with PCP within 1-2 days.  Return to clinic as needed.  To ED for any new acutely worsening symptoms.  Patient in agreement with plan of care.    DISCLAIMER: Please note that my documentation in this Electronic Healthcare Record was produced using speech recognition software and therefore may contain errors related to that software system.These could include grammar, punctuation and spelling errors or the inclusion/exclusion of phrases that were not intended. Garbled syntax, mangled pronouns, and other bizarre constructions may be attributed to that software system.

## 2022-08-08 RX ORDER — FLUCONAZOLE 150 MG/1
150 TABLET ORAL
Qty: 2 TABLET | Refills: 0 | Status: SHIPPED | OUTPATIENT
Start: 2022-08-08 | End: 2022-11-27

## 2022-08-08 NOTE — PROGRESS NOTES
Patient:  States vaginal yeast infection after prescription of Augmentin was prescribed on August 25th 2022.  States over-the-counter medications for vaginal yeast infection with no relief of symptoms.    
15-Colin-2017 15:26

## 2022-11-27 ENCOUNTER — OFFICE VISIT (OUTPATIENT)
Dept: URGENT CARE | Facility: CLINIC | Age: 37
End: 2022-11-27
Payer: COMMERCIAL

## 2022-11-27 VITALS
TEMPERATURE: 98 F | WEIGHT: 219 LBS | SYSTOLIC BLOOD PRESSURE: 126 MMHG | OXYGEN SATURATION: 97 % | HEART RATE: 78 BPM | BODY MASS INDEX: 31.35 KG/M2 | DIASTOLIC BLOOD PRESSURE: 74 MMHG | RESPIRATION RATE: 18 BRPM | HEIGHT: 70 IN

## 2022-11-27 DIAGNOSIS — J02.0 STREP PHARYNGITIS: ICD-10-CM

## 2022-11-27 DIAGNOSIS — J02.9 SORE THROAT: Primary | ICD-10-CM

## 2022-11-27 LAB
CTP QC/QA: YES
S PYO RRNA THROAT QL PROBE: POSITIVE

## 2022-11-27 PROCEDURE — 3008F PR BODY MASS INDEX (BMI) DOCUMENTED: ICD-10-PCS | Mod: S$GLB,,, | Performed by: STUDENT IN AN ORGANIZED HEALTH CARE EDUCATION/TRAINING PROGRAM

## 2022-11-27 PROCEDURE — 1159F MED LIST DOCD IN RCRD: CPT | Mod: S$GLB,,, | Performed by: STUDENT IN AN ORGANIZED HEALTH CARE EDUCATION/TRAINING PROGRAM

## 2022-11-27 PROCEDURE — 3078F PR MOST RECENT DIASTOLIC BLOOD PRESSURE < 80 MM HG: ICD-10-PCS | Mod: S$GLB,,, | Performed by: STUDENT IN AN ORGANIZED HEALTH CARE EDUCATION/TRAINING PROGRAM

## 2022-11-27 PROCEDURE — 3074F PR MOST RECENT SYSTOLIC BLOOD PRESSURE < 130 MM HG: ICD-10-PCS | Mod: S$GLB,,, | Performed by: STUDENT IN AN ORGANIZED HEALTH CARE EDUCATION/TRAINING PROGRAM

## 2022-11-27 PROCEDURE — 3008F BODY MASS INDEX DOCD: CPT | Mod: S$GLB,,, | Performed by: STUDENT IN AN ORGANIZED HEALTH CARE EDUCATION/TRAINING PROGRAM

## 2022-11-27 PROCEDURE — 87880 STREP A ASSAY W/OPTIC: CPT | Mod: QW,,, | Performed by: STUDENT IN AN ORGANIZED HEALTH CARE EDUCATION/TRAINING PROGRAM

## 2022-11-27 PROCEDURE — 3074F SYST BP LT 130 MM HG: CPT | Mod: S$GLB,,, | Performed by: STUDENT IN AN ORGANIZED HEALTH CARE EDUCATION/TRAINING PROGRAM

## 2022-11-27 PROCEDURE — 3078F DIAST BP <80 MM HG: CPT | Mod: S$GLB,,, | Performed by: STUDENT IN AN ORGANIZED HEALTH CARE EDUCATION/TRAINING PROGRAM

## 2022-11-27 PROCEDURE — 1160F PR REVIEW ALL MEDS BY PRESCRIBER/CLIN PHARMACIST DOCUMENTED: ICD-10-PCS | Mod: S$GLB,,, | Performed by: STUDENT IN AN ORGANIZED HEALTH CARE EDUCATION/TRAINING PROGRAM

## 2022-11-27 PROCEDURE — 1160F RVW MEDS BY RX/DR IN RCRD: CPT | Mod: S$GLB,,, | Performed by: STUDENT IN AN ORGANIZED HEALTH CARE EDUCATION/TRAINING PROGRAM

## 2022-11-27 PROCEDURE — 87880 POCT RAPID STREP A: ICD-10-PCS | Mod: QW,,, | Performed by: STUDENT IN AN ORGANIZED HEALTH CARE EDUCATION/TRAINING PROGRAM

## 2022-11-27 PROCEDURE — 99213 OFFICE O/P EST LOW 20 MIN: CPT | Mod: S$GLB,,, | Performed by: STUDENT IN AN ORGANIZED HEALTH CARE EDUCATION/TRAINING PROGRAM

## 2022-11-27 PROCEDURE — 1159F PR MEDICATION LIST DOCUMENTED IN MEDICAL RECORD: ICD-10-PCS | Mod: S$GLB,,, | Performed by: STUDENT IN AN ORGANIZED HEALTH CARE EDUCATION/TRAINING PROGRAM

## 2022-11-27 PROCEDURE — 99213 PR OFFICE/OUTPT VISIT, EST, LEVL III, 20-29 MIN: ICD-10-PCS | Mod: S$GLB,,, | Performed by: STUDENT IN AN ORGANIZED HEALTH CARE EDUCATION/TRAINING PROGRAM

## 2022-11-27 RX ORDER — AMOXICILLIN AND CLAVULANATE POTASSIUM 875; 125 MG/1; MG/1
1 TABLET, FILM COATED ORAL EVERY 12 HOURS
Qty: 20 TABLET | Refills: 0 | Status: SHIPPED | OUTPATIENT
Start: 2022-11-27 | End: 2022-12-07

## 2022-11-27 RX ORDER — FLUCONAZOLE 150 MG/1
150 TABLET ORAL DAILY
Qty: 1 TABLET | Refills: 0 | Status: SHIPPED | OUTPATIENT
Start: 2022-11-27 | End: 2022-11-28

## 2022-11-27 RX ORDER — BUPRENORPHINE 2 MG/1
4 TABLET SUBLINGUAL
COMMUNITY

## 2022-11-27 RX ORDER — BENZOCAINE/MENTHOL 15 MG-10MG
1 LOZENGE MUCOUS MEMBRANE
Qty: 30 LOZENGE | Refills: 0 | Status: SHIPPED | OUTPATIENT
Start: 2022-11-27

## 2022-11-27 RX ORDER — ALPRAZOLAM 2 MG/1
2 TABLET ORAL DAILY PRN
COMMUNITY
Start: 2022-11-16

## 2022-11-27 NOTE — PROGRESS NOTES
"Subjective:       Patient ID: Rosario Barrera is a 37 y.o. female.    Vitals:  height is 5' 10" (1.778 m) and weight is 99.3 kg (219 lb). Her oral temperature is 98.2 °F (36.8 °C). Her blood pressure is 126/74 and her pulse is 78. Her respiration is 18 and oxygen saturation is 97%.     Chief Complaint: Sore Throat (Sore throat x 1 week, started with a fever x 3 days ago./Exposed to strep)    Patient is a 37-year-old female who presents to clinic for evaluation of sore throat.  Patient with past medical history of eczema, anemia, and hepatitis-C.  Patient reports she is vaccinated.  Patient reports children sick with strep throat.  Patient reports symptoms x1 week.  Patient reports over-the-counter medications with no relief of symptoms.  Patient reports experiencing a fever with a temperature max of 100.5° F and a sore throat with painful swallowing.  Patient denies any acute fatigue, headaches or dizziness, generalized body aches, ear pain, nasal sinus congestion, drooling or voice change, chest pain or palpitations, shortness of breath or cough, abdominal pain, nausea or vomiting, diarrhea, urinary symptoms, rash, or change in mentation.  Patient states it feels like swallowing glass when she swallows.      Constitution: Positive for fever (Temperature max 100.5° F). Negative for fatigue.   HENT:  Positive for sore throat and trouble swallowing (Painful swallowing). Negative for ear pain, drooling, congestion and voice change.    Neck: neck negative.   Cardiovascular: Negative.  Negative for chest pain and palpitations.   Eyes: Negative.    Respiratory: Negative.  Negative for chest tightness, cough and shortness of breath.    Gastrointestinal: Negative.  Negative for abdominal pain, nausea, vomiting and diarrhea.   Endocrine: negative.   Genitourinary: Negative.  Negative for dysuria, frequency and urgency.   Musculoskeletal: Negative.  Negative for muscle ache.   Skin: Negative.  Negative for color change, " pale, rash and erythema.   Allergic/Immunologic: Negative.    Neurological: Negative.  Negative for dizziness, light-headedness, passing out, headaches, disorientation and altered mental status.   Hematologic/Lymphatic: Negative.    Psychiatric/Behavioral: Negative.  Negative for altered mental status, disorientation and confusion.      Objective:      Physical Exam   Constitutional: She is oriented to person, place, and time. She appears well-developed. She is cooperative.  Non-toxic appearance. She does not appear ill. No distress.   HENT:   Head: Normocephalic and atraumatic.   Ears:   Right Ear: Hearing, tympanic membrane, external ear and ear canal normal.   Left Ear: Hearing, tympanic membrane, external ear and ear canal normal.   Nose: Nose normal. No mucosal edema, rhinorrhea, nasal deformity or congestion. No epistaxis. Right sinus exhibits no maxillary sinus tenderness and no frontal sinus tenderness. Left sinus exhibits no maxillary sinus tenderness and no frontal sinus tenderness.   Mouth/Throat: Uvula is midline and mucous membranes are normal. Mucous membranes are moist. No trismus in the jaw. Normal dentition. No uvula swelling. Posterior oropharyngeal erythema and cobblestoning present. No oropharyngeal exudate. Oropharynx is clear.   Eyes: Conjunctivae and lids are normal. Pupils are equal, round, and reactive to light. Right eye exhibits no discharge. Left eye exhibits no discharge. No scleral icterus.   Neck: Trachea normal and phonation normal. Neck supple.   Cardiovascular: Normal rate, regular rhythm, normal heart sounds and normal pulses.   Pulmonary/Chest: Effort normal and breath sounds normal. No respiratory distress. She has no wheezes. She has no rhonchi. She has no rales.   Abdominal: Normal appearance and bowel sounds are normal. She exhibits no distension. Soft. There is no abdominal tenderness.   Musculoskeletal: Normal range of motion.         General: No deformity. Normal range of  motion.   Neurological: She is alert and oriented to person, place, and time. She exhibits normal muscle tone. Coordination normal.   Skin: Skin is warm, dry, intact, not diaphoretic, not pale and no rash. Capillary refill takes less than 2 seconds. No erythema   Psychiatric: Her speech is normal and behavior is normal. Judgment and thought content normal.   Nursing note and vitals reviewed.      Assessment:       1. Sore throat    2. Strep pharyngitis            Plan:         Sore throat  -     POCT rapid strep A    Strep pharyngitis    Other orders  -     amoxicillin-clavulanate 875-125mg (AUGMENTIN) 875-125 mg per tablet; Take 1 tablet by mouth every 12 (twelve) hours. for 10 days  Dispense: 20 tablet; Refill: 0  -     benzocaine-menthoL (CHLORASEPTIC MAX) 15-10 mg Lozg; 1 lozenge by Mucous Membrane route every 2 (two) hours as needed (Sore throat).  Dispense: 30 lozenge; Refill: 0  -     fluconazole (DIFLUCAN) 150 MG Tab; Take 1 tablet (150 mg total) by mouth once daily. for 1 day  Dispense: 1 tablet; Refill: 0               Labs:  Rapid strep positive.  Take medications as prescribed.  Tylenol/Motrin per package instructions for any pain or fever.  Recommend warm salt water gargles every 2-3 hours while awake.  Recommend replacing toothbrush and washing linens in hot water 48 hours after starting antibiotics.  Follow-up with PCP in 1-2 days.  Follow-up ENT as needed.  Return to clinic as needed.  To ED for any new or acutely worsening symptoms.  Patient in agreement with plan of care.     DISCLAIMER: Please note that my documentation in this Electronic Healthcare Record was produced using speech recognition software and therefore may contain errors related to that software system.These could include grammar, punctuation and spelling errors or the inclusion/exclusion of phrases that were not intended. Garbled syntax, mangled pronouns, and other bizarre constructions may be attributed to that software system.

## 2022-12-10 ENCOUNTER — OFFICE VISIT (OUTPATIENT)
Dept: URGENT CARE | Facility: CLINIC | Age: 37
End: 2022-12-10
Payer: COMMERCIAL

## 2022-12-10 VITALS
OXYGEN SATURATION: 98 % | WEIGHT: 219 LBS | SYSTOLIC BLOOD PRESSURE: 132 MMHG | DIASTOLIC BLOOD PRESSURE: 86 MMHG | HEART RATE: 78 BPM | TEMPERATURE: 98 F | HEIGHT: 70 IN | BODY MASS INDEX: 31.35 KG/M2

## 2022-12-10 DIAGNOSIS — J02.0 STREP THROAT: ICD-10-CM

## 2022-12-10 DIAGNOSIS — J02.9 SORE THROAT: Primary | ICD-10-CM

## 2022-12-10 LAB
CTP QC/QA: YES
S PYO RRNA THROAT QL PROBE: POSITIVE

## 2022-12-10 PROCEDURE — 1159F MED LIST DOCD IN RCRD: CPT | Mod: S$GLB,,, | Performed by: NURSE PRACTITIONER

## 2022-12-10 PROCEDURE — 87880 STREP A ASSAY W/OPTIC: CPT | Mod: QW,,, | Performed by: NURSE PRACTITIONER

## 2022-12-10 PROCEDURE — 3075F PR MOST RECENT SYSTOLIC BLOOD PRESS GE 130-139MM HG: ICD-10-PCS | Mod: S$GLB,,, | Performed by: NURSE PRACTITIONER

## 2022-12-10 PROCEDURE — 1160F RVW MEDS BY RX/DR IN RCRD: CPT | Mod: S$GLB,,, | Performed by: NURSE PRACTITIONER

## 2022-12-10 PROCEDURE — 3008F PR BODY MASS INDEX (BMI) DOCUMENTED: ICD-10-PCS | Mod: S$GLB,,, | Performed by: NURSE PRACTITIONER

## 2022-12-10 PROCEDURE — 3079F PR MOST RECENT DIASTOLIC BLOOD PRESSURE 80-89 MM HG: ICD-10-PCS | Mod: S$GLB,,, | Performed by: NURSE PRACTITIONER

## 2022-12-10 PROCEDURE — 99214 OFFICE O/P EST MOD 30 MIN: CPT | Mod: 25,S$GLB,, | Performed by: NURSE PRACTITIONER

## 2022-12-10 PROCEDURE — 96372 THER/PROPH/DIAG INJ SC/IM: CPT | Mod: S$GLB,,, | Performed by: NURSE PRACTITIONER

## 2022-12-10 PROCEDURE — 99214 PR OFFICE/OUTPT VISIT, EST, LEVL IV, 30-39 MIN: ICD-10-PCS | Mod: 25,S$GLB,, | Performed by: NURSE PRACTITIONER

## 2022-12-10 PROCEDURE — 96372 PR INJECTION,THERAP/PROPH/DIAG2ST, IM OR SUBCUT: ICD-10-PCS | Mod: S$GLB,,, | Performed by: NURSE PRACTITIONER

## 2022-12-10 PROCEDURE — 87880 POCT RAPID STREP A: ICD-10-PCS | Mod: QW,,, | Performed by: NURSE PRACTITIONER

## 2022-12-10 PROCEDURE — 3079F DIAST BP 80-89 MM HG: CPT | Mod: S$GLB,,, | Performed by: NURSE PRACTITIONER

## 2022-12-10 PROCEDURE — 3075F SYST BP GE 130 - 139MM HG: CPT | Mod: S$GLB,,, | Performed by: NURSE PRACTITIONER

## 2022-12-10 PROCEDURE — 1159F PR MEDICATION LIST DOCUMENTED IN MEDICAL RECORD: ICD-10-PCS | Mod: S$GLB,,, | Performed by: NURSE PRACTITIONER

## 2022-12-10 PROCEDURE — 3008F BODY MASS INDEX DOCD: CPT | Mod: S$GLB,,, | Performed by: NURSE PRACTITIONER

## 2022-12-10 PROCEDURE — 1160F PR REVIEW ALL MEDS BY PRESCRIBER/CLIN PHARMACIST DOCUMENTED: ICD-10-PCS | Mod: S$GLB,,, | Performed by: NURSE PRACTITIONER

## 2022-12-10 RX ORDER — FLUCONAZOLE 150 MG/1
150 TABLET ORAL DAILY
Qty: 1 TABLET | Refills: 0 | Status: SHIPPED | OUTPATIENT
Start: 2022-12-10 | End: 2022-12-11

## 2022-12-10 RX ORDER — PENICILLIN V POTASSIUM 500 MG/1
500 TABLET, FILM COATED ORAL EVERY 12 HOURS
Qty: 20 TABLET | Refills: 0 | Status: SHIPPED | OUTPATIENT
Start: 2022-12-10 | End: 2022-12-20

## 2022-12-10 RX ORDER — DEXAMETHASONE SODIUM PHOSPHATE 4 MG/ML
8 INJECTION, SOLUTION INTRA-ARTICULAR; INTRALESIONAL; INTRAMUSCULAR; INTRAVENOUS; SOFT TISSUE
Status: COMPLETED | OUTPATIENT
Start: 2022-12-10 | End: 2022-12-10

## 2022-12-10 RX ADMIN — DEXAMETHASONE SODIUM PHOSPHATE 8 MG: 4 INJECTION, SOLUTION INTRA-ARTICULAR; INTRALESIONAL; INTRAMUSCULAR; INTRAVENOUS; SOFT TISSUE at 01:12

## 2022-12-10 NOTE — PROGRESS NOTES
"Subjective:       Patient ID: Rosario Barrera is a 37 y.o. female.    Vitals:  height is 5' 10" (1.778 m) and weight is 99.3 kg (219 lb). Her oral temperature is 97.5 °F (36.4 °C). Her blood pressure is 132/86 and her pulse is 78. Her oxygen saturation is 98%.     Chief Complaint: Sore Throat  -  The patient is a 36 y/o female that presents to the  with c/o sore throat x 24 hr with recent diagnosis of step prescribed pcn yet did not complete rx. Denies fever or other s/s.   -  Sore Throat   This is a new problem. The current episode started yesterday. The problem has been unchanged. There has been no fever.     Constitution: Negative.   HENT:  Positive for sore throat.    Neck: neck negative.   Cardiovascular: Negative.    Eyes: Negative.    Respiratory: Negative.     Gastrointestinal: Negative.    Endocrine: negative.   Genitourinary: Negative.    Musculoskeletal: Negative.    Skin: Negative.    Allergic/Immunologic: Negative.    Neurological: Negative.    Hematologic/Lymphatic: Negative.      Objective:      Physical Exam   Constitutional: She is oriented to person, place, and time. She appears well-developed. She appears ill. No distress. obesity  HENT:   Head: Normocephalic.   Mouth/Throat: Uvula is midline. Posterior oropharyngeal edema and posterior oropharyngeal erythema present. Tonsils are 0 on the right. Tonsils are 0 on the left. No tonsillar exudate.   Eyes: Conjunctivae are normal.   Cardiovascular: Normal rate, regular rhythm and normal heart sounds.   No murmur heard.  Pulmonary/Chest: Effort normal and breath sounds normal.   Abdominal: Normal appearance.   Musculoskeletal: Normal range of motion.         General: Normal range of motion.   Neurological: She is alert and oriented to person, place, and time.   Skin: Skin is warm and dry.   Psychiatric: Her behavior is normal. Judgment and thought content normal.       Assessment:       1. Sore throat    2. Strep throat          Plan:     1- PCN v " for 10 days  2- decadron IM for sore throat  3- RTC PRN    Sore throat  -     POCT rapid strep A  -     dexAMETHasone injection 8 mg    Strep throat  -     penicillin v potassium (VEETID) 500 MG tablet; Take 1 tablet (500 mg total) by mouth every 12 (twelve) hours. for 10 days  Dispense: 20 tablet; Refill: 0    Other orders  -     fluconazole (DIFLUCAN) 150 MG Tab; Take 1 tablet (150 mg total) by mouth once daily. for 1 day  Dispense: 1 tablet; Refill: 0

## 2022-12-20 ENCOUNTER — OFFICE VISIT (OUTPATIENT)
Dept: URGENT CARE | Facility: CLINIC | Age: 37
End: 2022-12-20
Payer: COMMERCIAL

## 2022-12-20 VITALS
TEMPERATURE: 98 F | HEIGHT: 70 IN | HEART RATE: 95 BPM | DIASTOLIC BLOOD PRESSURE: 71 MMHG | OXYGEN SATURATION: 98 % | RESPIRATION RATE: 18 BRPM | SYSTOLIC BLOOD PRESSURE: 120 MMHG | BODY MASS INDEX: 31.35 KG/M2 | WEIGHT: 219 LBS

## 2022-12-20 DIAGNOSIS — U07.1 COVID-19: ICD-10-CM

## 2022-12-20 DIAGNOSIS — R05.9 COUGH, UNSPECIFIED TYPE: Primary | ICD-10-CM

## 2022-12-20 LAB
CTP QC/QA: YES
FLUAV AG NPH QL: NEGATIVE
FLUBV AG NPH QL: NEGATIVE
S PYO RRNA THROAT QL PROBE: NEGATIVE
SARS-COV-2 AG RESP QL IA.RAPID: POSITIVE

## 2022-12-20 PROCEDURE — 87880 STREP A ASSAY W/OPTIC: CPT | Mod: QW,,, | Performed by: STUDENT IN AN ORGANIZED HEALTH CARE EDUCATION/TRAINING PROGRAM

## 2022-12-20 PROCEDURE — 87804 INFLUENZA ASSAY W/OPTIC: CPT | Mod: QW,,, | Performed by: STUDENT IN AN ORGANIZED HEALTH CARE EDUCATION/TRAINING PROGRAM

## 2022-12-20 PROCEDURE — 1160F RVW MEDS BY RX/DR IN RCRD: CPT | Mod: S$GLB,,, | Performed by: STUDENT IN AN ORGANIZED HEALTH CARE EDUCATION/TRAINING PROGRAM

## 2022-12-20 PROCEDURE — 1159F PR MEDICATION LIST DOCUMENTED IN MEDICAL RECORD: ICD-10-PCS | Mod: S$GLB,,, | Performed by: STUDENT IN AN ORGANIZED HEALTH CARE EDUCATION/TRAINING PROGRAM

## 2022-12-20 PROCEDURE — 3074F SYST BP LT 130 MM HG: CPT | Mod: S$GLB,,, | Performed by: STUDENT IN AN ORGANIZED HEALTH CARE EDUCATION/TRAINING PROGRAM

## 2022-12-20 PROCEDURE — 99214 OFFICE O/P EST MOD 30 MIN: CPT | Mod: 25,S$GLB,, | Performed by: STUDENT IN AN ORGANIZED HEALTH CARE EDUCATION/TRAINING PROGRAM

## 2022-12-20 PROCEDURE — 3008F PR BODY MASS INDEX (BMI) DOCUMENTED: ICD-10-PCS | Mod: S$GLB,,, | Performed by: STUDENT IN AN ORGANIZED HEALTH CARE EDUCATION/TRAINING PROGRAM

## 2022-12-20 PROCEDURE — 3078F PR MOST RECENT DIASTOLIC BLOOD PRESSURE < 80 MM HG: ICD-10-PCS | Mod: S$GLB,,, | Performed by: STUDENT IN AN ORGANIZED HEALTH CARE EDUCATION/TRAINING PROGRAM

## 2022-12-20 PROCEDURE — 1160F PR REVIEW ALL MEDS BY PRESCRIBER/CLIN PHARMACIST DOCUMENTED: ICD-10-PCS | Mod: S$GLB,,, | Performed by: STUDENT IN AN ORGANIZED HEALTH CARE EDUCATION/TRAINING PROGRAM

## 2022-12-20 PROCEDURE — 87811 SARS-COV-2 COVID19 W/OPTIC: CPT | Mod: QW,S$GLB,, | Performed by: STUDENT IN AN ORGANIZED HEALTH CARE EDUCATION/TRAINING PROGRAM

## 2022-12-20 PROCEDURE — 3074F PR MOST RECENT SYSTOLIC BLOOD PRESSURE < 130 MM HG: ICD-10-PCS | Mod: S$GLB,,, | Performed by: STUDENT IN AN ORGANIZED HEALTH CARE EDUCATION/TRAINING PROGRAM

## 2022-12-20 PROCEDURE — 99214 PR OFFICE/OUTPT VISIT, EST, LEVL IV, 30-39 MIN: ICD-10-PCS | Mod: 25,S$GLB,, | Performed by: STUDENT IN AN ORGANIZED HEALTH CARE EDUCATION/TRAINING PROGRAM

## 2022-12-20 PROCEDURE — 87880 POCT RAPID STREP A: ICD-10-PCS | Mod: QW,,, | Performed by: STUDENT IN AN ORGANIZED HEALTH CARE EDUCATION/TRAINING PROGRAM

## 2022-12-20 PROCEDURE — 3078F DIAST BP <80 MM HG: CPT | Mod: S$GLB,,, | Performed by: STUDENT IN AN ORGANIZED HEALTH CARE EDUCATION/TRAINING PROGRAM

## 2022-12-20 PROCEDURE — 87811 SARS CORONAVIRUS 2 ANTIGEN POCT, MANUAL READ: ICD-10-PCS | Mod: QW,S$GLB,, | Performed by: STUDENT IN AN ORGANIZED HEALTH CARE EDUCATION/TRAINING PROGRAM

## 2022-12-20 PROCEDURE — 1159F MED LIST DOCD IN RCRD: CPT | Mod: S$GLB,,, | Performed by: STUDENT IN AN ORGANIZED HEALTH CARE EDUCATION/TRAINING PROGRAM

## 2022-12-20 PROCEDURE — 87804 POCT INFLUENZA A/B: ICD-10-PCS | Mod: 59,QW,, | Performed by: STUDENT IN AN ORGANIZED HEALTH CARE EDUCATION/TRAINING PROGRAM

## 2022-12-20 PROCEDURE — 3008F BODY MASS INDEX DOCD: CPT | Mod: S$GLB,,, | Performed by: STUDENT IN AN ORGANIZED HEALTH CARE EDUCATION/TRAINING PROGRAM

## 2022-12-20 RX ORDER — BUPRENORPHINE HYDROCHLORIDE 8 MG/1
8 TABLET SUBLINGUAL 3 TIMES DAILY
COMMUNITY
Start: 2022-12-06

## 2022-12-20 RX ORDER — DEXCHLORPHENIRAMINE MALEATE, DEXTROMETHORPHAN HBR, PHENYLEPHRINE HCL 1; 10; 5 MG/5ML; MG/5ML; MG/5ML
10 SYRUP ORAL 4 TIMES DAILY PRN
Qty: 180 ML | Refills: 0 | Status: SHIPPED | OUTPATIENT
Start: 2022-12-20 | End: 2022-12-20

## 2022-12-20 RX ORDER — DEXCHLORPHENIRAMINE MALEATE, DEXTROMETHORPHAN HBR, PHENYLEPHRINE HCL 1; 10; 5 MG/5ML; MG/5ML; MG/5ML
10 SYRUP ORAL 4 TIMES DAILY PRN
Qty: 180 ML | Refills: 0 | Status: SHIPPED | OUTPATIENT
Start: 2022-12-20

## 2022-12-20 NOTE — PROGRESS NOTES
"Subjective:       Patient ID: Rosario Barrera is a 37 y.o. female.    Vitals:  height is 5' 10" (1.778 m) and weight is 99.3 kg (219 lb). Her oral temperature is 98.1 °F (36.7 °C). Her blood pressure is 120/71 and her pulse is 95. Her respiration is 18 and oxygen saturation is 98%.     Chief Complaint: Sore Throat and Sinus Problem    Patient is a 37-year-old female who presents to clinic for evaluation of sore throat.  Patient with past medical history of eczema, anemia, and hepatitis-C.  Patient reports she is vaccinated.  Patient reports unknown sick exposures as she is recently returning from [place].  Patient reports she has experienced symptoms for 2 days.  Patient reports over-the-counter medications with some relief of symptoms.  Patient states that she has experience fatigue, chills, nasal sinus congestion with postnasal drainage, sore throat painful swallowing, nonproductive cough, generalized body aches, generalized headaches.  Patient states that she is not experienced any dizziness, ear pain, drooling or voice change, chest pain or palpitations, shortness of breath, abdominal pain, nausea or vomiting, diarrhea, urinary symptoms, rash, or change in mentation.    Sore Throat   This is a new problem. The current episode started in the past 7 days (2 days). The problem has been gradually worsening. The maximum temperature recorded prior to her arrival was 100.4 - 100.9 F. The fever has been present for 1 to 2 days. The pain is at a severity of 5/10. Associated symptoms include congestion, coughing, headaches, a hoarse voice and trouble swallowing (Painful swallowing). Pertinent negatives include no abdominal pain, diarrhea, drooling, ear pain, shortness of breath or vomiting.   Sinus Problem  Associated symptoms include chills, congestion, coughing, headaches, a hoarse voice and a sore throat. Pertinent negatives include no ear pain or shortness of breath.     Constitution: Positive for chills and " fatigue.   HENT:  Positive for congestion, postnasal drip, sore throat and trouble swallowing (Painful swallowing). Negative for ear pain, drooling and voice change.    Neck: neck negative.   Cardiovascular: Negative.  Negative for chest pain and palpitations.   Eyes: Negative.    Respiratory:  Positive for cough. Negative for chest tightness, sputum production, shortness of breath and wheezing.    Gastrointestinal: Negative.  Negative for abdominal pain, nausea, vomiting and diarrhea.   Endocrine: negative.   Genitourinary: Negative.  Negative for dysuria, frequency and urgency.   Musculoskeletal:  Positive for muscle ache.   Skin: Negative.  Negative for color change, pale, rash and erythema.   Allergic/Immunologic: Negative.    Neurological:  Positive for headaches. Negative for dizziness, light-headedness, passing out, disorientation and altered mental status.   Hematologic/Lymphatic: Negative.    Psychiatric/Behavioral: Negative.  Negative for altered mental status, disorientation and confusion.      Objective:      Physical Exam   Constitutional: She is oriented to person, place, and time. She appears well-developed. She is cooperative.  Non-toxic appearance. She appears ill. No distress.   HENT:   Head: Normocephalic and atraumatic.   Ears:   Right Ear: Hearing, tympanic membrane, external ear and ear canal normal.   Left Ear: Hearing, tympanic membrane, external ear and ear canal normal.   Nose: Congestion present. No mucosal edema, rhinorrhea or nasal deformity. No epistaxis. Right sinus exhibits no maxillary sinus tenderness and no frontal sinus tenderness. Left sinus exhibits no maxillary sinus tenderness and no frontal sinus tenderness.   Mouth/Throat: Uvula is midline and mucous membranes are normal. Mucous membranes are moist. No trismus in the jaw. Normal dentition. No uvula swelling. Posterior oropharyngeal erythema and cobblestoning present. No oropharyngeal exudate. Oropharynx is clear.   Eyes:  Conjunctivae and lids are normal. Pupils are equal, round, and reactive to light. Right eye exhibits no discharge. Left eye exhibits no discharge. No scleral icterus.   Neck: Trachea normal and phonation normal. Neck supple. No neck rigidity present.   Cardiovascular: Normal rate, regular rhythm, normal heart sounds and normal pulses.   Pulmonary/Chest: Effort normal and breath sounds normal. No respiratory distress. She has no wheezes. She has no rhonchi. She has no rales.   Abdominal: Normal appearance and bowel sounds are normal. She exhibits no distension. Soft. There is no abdominal tenderness.   Musculoskeletal: Normal range of motion.         General: No deformity. Normal range of motion.      Cervical back: She exhibits no tenderness.   Lymphadenopathy:     She has no cervical adenopathy.   Neurological: She is alert and oriented to person, place, and time. She exhibits normal muscle tone. Coordination normal.   Skin: Skin is warm, dry, intact, not diaphoretic, not pale and no rash. Capillary refill takes less than 2 seconds. No erythema   Psychiatric: Her speech is normal and behavior is normal. Judgment and thought content normal.   Nursing note and vitals reviewed.      Assessment:       1. Cough, unspecified type    2. COVID-19          Plan:         Cough, unspecified type  -     POCT Influenza A/B  -     SARS Coronavirus 2 Antigen, POCT Manual Read  -     POCT rapid strep A    COVID-19                 Labs:  COVID positive.  Influenza A and B negative.  Rapid strep negative.  Quarantine as directed.  Quarantine information provided to patient.  COVID recommendations provided.  (Vitamin-C, vitamin D3, Pepcid, Zyrtec, zinc)  Tylenol per package instructions for any pain or fever.  May rotate with Motrin if unable to control pain or fever along with Tylenol.  Monitor home SpO2 and present to emergency department with readings less than 92%.  Take medications as prescribed.  Assure adequate  hydration.  Follow-up with PCP in 1-2 days.  Return to clinic as needed.  To ED for any new or acutely worsening symptoms including but not limited to chest pain, palpitations, shortness of breath, or fever greater than 103° F.  Patient in agreement with plan of care.     DISCLAIMER: Please note that my documentation in this Electronic Healthcare Record was produced using speech recognition software and therefore may contain errors related to that software system.These could include grammar, punctuation and spelling errors or the inclusion/exclusion of phrases that were not intended. Garbled syntax, mangled pronouns, and other bizarre constructions may be attributed to that software system.